# Patient Record
Sex: FEMALE | Race: WHITE | NOT HISPANIC OR LATINO | Employment: FULL TIME | URBAN - METROPOLITAN AREA
[De-identification: names, ages, dates, MRNs, and addresses within clinical notes are randomized per-mention and may not be internally consistent; named-entity substitution may affect disease eponyms.]

---

## 2019-10-16 ENCOUNTER — HOSPITAL ENCOUNTER (OUTPATIENT)
Dept: LAB | Facility: HOSPITAL | Age: 29
Discharge: HOME | End: 2019-10-16
Attending: INTERNAL MEDICINE

## 2019-10-16 ENCOUNTER — OFFICE VISIT CONVERTED (OUTPATIENT)
Dept: FAMILY MEDICINE CLINIC | Facility: CLINIC | Age: 29
End: 2019-10-16
Attending: INTERNAL MEDICINE

## 2019-10-16 LAB
ALBUMIN SERPL-MCNC: 4.7 G/DL (ref 3.5–5)
ALBUMIN/GLOB SERPL: 1.8 {RATIO} (ref 1.4–2.6)
ALP SERPL-CCNC: 55 U/L (ref 42–98)
ALT SERPL-CCNC: 16 U/L (ref 10–40)
ANION GAP SERPL CALC-SCNC: 25 MMOL/L (ref 8–19)
AST SERPL-CCNC: 20 U/L (ref 15–50)
BASOPHILS # BLD AUTO: 0.04 10*3/UL (ref 0–0.2)
BASOPHILS NFR BLD AUTO: 0.6 % (ref 0–3)
BILIRUB SERPL-MCNC: 0.46 MG/DL (ref 0.2–1.3)
BUN SERPL-MCNC: 10 MG/DL (ref 5–25)
BUN/CREAT SERPL: 13 {RATIO} (ref 6–20)
CALCIUM SERPL-MCNC: 9.6 MG/DL (ref 8.7–10.4)
CHLORIDE SERPL-SCNC: 104 MMOL/L (ref 99–111)
CHOLEST SERPL-MCNC: 157 MG/DL (ref 107–200)
CHOLEST/HDLC SERPL: 2 {RATIO} (ref 3–6)
CONV ABS IMM GRAN: 0.02 10*3/UL (ref 0–0.2)
CONV CO2: 20 MMOL/L (ref 22–32)
CONV IMMATURE GRAN: 0.3 % (ref 0–1.8)
CONV TOTAL PROTEIN: 7.3 G/DL (ref 6.3–8.2)
CREAT UR-MCNC: 0.76 MG/DL (ref 0.5–0.9)
DEPRECATED RDW RBC AUTO: 45.8 FL (ref 36.4–46.3)
EOSINOPHIL # BLD AUTO: 0.16 10*3/UL (ref 0–0.7)
EOSINOPHIL # BLD AUTO: 2.4 % (ref 0–7)
ERYTHROCYTE [DISTWIDTH] IN BLOOD BY AUTOMATED COUNT: 13.6 % (ref 11.7–14.4)
EST. AVERAGE GLUCOSE BLD GHB EST-MCNC: 82 MG/DL
GFR SERPLBLD BASED ON 1.73 SQ M-ARVRAT: >60 ML/MIN/{1.73_M2}
GLOBULIN UR ELPH-MCNC: 2.6 G/DL (ref 2–3.5)
GLUCOSE SERPL-MCNC: 99 MG/DL (ref 65–99)
HBA1C MFR BLD: 4.5 % (ref 3.5–5.7)
HCT VFR BLD AUTO: 41.7 % (ref 37–47)
HDLC SERPL-MCNC: 79 MG/DL (ref 40–60)
HGB BLD-MCNC: 13.1 G/DL (ref 12–16)
LDLC SERPL CALC-MCNC: 63 MG/DL (ref 70–100)
LYMPHOCYTES # BLD AUTO: 1.74 10*3/UL (ref 1–5)
LYMPHOCYTES NFR BLD AUTO: 26.6 % (ref 20–45)
MCH RBC QN AUTO: 28.9 PG (ref 27–31)
MCHC RBC AUTO-ENTMCNC: 31.4 G/DL (ref 33–37)
MCV RBC AUTO: 92.1 FL (ref 81–99)
MONOCYTES # BLD AUTO: 0.49 10*3/UL (ref 0.2–1.2)
MONOCYTES NFR BLD AUTO: 7.5 % (ref 3–10)
NEUTROPHILS # BLD AUTO: 4.1 10*3/UL (ref 2–8)
NEUTROPHILS NFR BLD AUTO: 62.6 % (ref 30–85)
NRBC CBCN: 0 % (ref 0–0.7)
OSMOLALITY SERPL CALC.SUM OF ELEC: 299 MOSM/KG (ref 273–304)
PLATELET # BLD AUTO: 172 10*3/UL (ref 130–400)
PMV BLD AUTO: 11.2 FL (ref 9.4–12.3)
POTASSIUM SERPL-SCNC: 3.9 MMOL/L (ref 3.5–5.3)
RBC # BLD AUTO: 4.53 10*6/UL (ref 4.2–5.4)
SODIUM SERPL-SCNC: 145 MMOL/L (ref 135–147)
TRIGL SERPL-MCNC: 74 MG/DL (ref 40–150)
VLDLC SERPL-MCNC: 15 MG/DL (ref 5–37)
WBC # BLD AUTO: 6.55 10*3/UL (ref 4.8–10.8)

## 2019-12-31 ENCOUNTER — HOSPITAL ENCOUNTER (OUTPATIENT)
Dept: LAB | Facility: HOSPITAL | Age: 29
Discharge: HOME | End: 2019-12-31
Attending: INTERNAL MEDICINE

## 2019-12-31 LAB
ALBUMIN SERPL-MCNC: 4.8 G/DL (ref 3.5–5)
ALBUMIN/GLOB SERPL: 1.6 {RATIO} (ref 1.4–2.6)
ALP SERPL-CCNC: 53 U/L (ref 42–98)
ALT SERPL-CCNC: 14 U/L (ref 10–40)
ANION GAP SERPL CALC-SCNC: 20 MMOL/L (ref 8–19)
AST SERPL-CCNC: 17 U/L (ref 15–50)
BILIRUB SERPL-MCNC: 0.36 MG/DL (ref 0.2–1.3)
BUN SERPL-MCNC: 15 MG/DL (ref 5–25)
BUN/CREAT SERPL: 18 {RATIO} (ref 6–20)
CALCIUM SERPL-MCNC: 9.7 MG/DL (ref 8.7–10.4)
CHLORIDE SERPL-SCNC: 100 MMOL/L (ref 99–111)
CONV CO2: 24 MMOL/L (ref 22–32)
CONV TOTAL PROTEIN: 7.8 G/DL (ref 6.3–8.2)
CREAT UR-MCNC: 0.83 MG/DL (ref 0.5–0.9)
GFR SERPLBLD BASED ON 1.73 SQ M-ARVRAT: >60 ML/MIN/{1.73_M2}
GLOBULIN UR ELPH-MCNC: 3 G/DL (ref 2–3.5)
GLUCOSE SERPL-MCNC: 89 MG/DL (ref 65–99)
OSMOLALITY SERPL CALC.SUM OF ELEC: 290 MOSM/KG (ref 273–304)
POTASSIUM SERPL-SCNC: 3.7 MMOL/L (ref 3.5–5.3)
SODIUM SERPL-SCNC: 140 MMOL/L (ref 135–147)

## 2020-02-20 ENCOUNTER — HOSPITAL ENCOUNTER (OUTPATIENT)
Dept: GENERAL RADIOLOGY | Facility: HOSPITAL | Age: 30
Discharge: HOME | End: 2020-02-20
Attending: OBSTETRICS & GYNECOLOGY

## 2020-10-28 ENCOUNTER — OFFICE VISIT CONVERTED (OUTPATIENT)
Dept: FAMILY MEDICINE CLINIC | Facility: CLINIC | Age: 30
End: 2020-10-28
Attending: INTERNAL MEDICINE

## 2020-12-10 ENCOUNTER — OFFICE VISIT CONVERTED (OUTPATIENT)
Dept: FAMILY MEDICINE CLINIC | Facility: CLINIC | Age: 30
End: 2020-12-10
Attending: INTERNAL MEDICINE

## 2021-05-13 NOTE — PROGRESS NOTES
Progress Note      Patient Name: Sera Cordova   Patient ID: [id number]   Sex: Female   YOB: 1990        Visit Date: October 28, 2020    Provider: Sunny Felix DO   Location: [facility]   Location Address: [address] [place] [address], [address]  [place], [place]  [id number]   Location Phone: [phone]          Chief Complaint  · Anxiety   · Trouble Sleeping      History Of Present Illness  Sera Cordova is a 30 year old /White female who presents for evaluation and treatment of:      Pt is here for 1 year f/u.    Pt states that her last visit you discuss about her having trouble sleeping and her anxiety but last year she didn't want to start medications. Patient has no trouble falling asleep but does have trouble staying asleep. Once she wakes up, she has trouble falling asleep.    Pt states that she would like to start medication since everything has been worst d/t work, life and stress. Patient had a miscarriage this previous April and has some depression and anxiety as a result of that. Patient is tearful at times during the encounter.       Past Surgical History  Procedure Name Date Notes   Appendectomy --  --    [place] Tooth Extraction --  --          Medication List  Name Date Started Instructions   cpap machine w/supplies 11/25/2019 use as directed QHS 15cm/h2o pressures (dx: G47.33 RONN)   Flonase Allergy Relief 50 mcg/actuation nasal spray,suspension  spray 1 spray (50 mcg) in each nostril by intranasal route once daily   Zyrtec 10 mg oral capsule  take 1 capsule by oral route daily         Allergy List  Allergen Name Date Reaction Notes   PENICILLINS --  --  --    SULFA (SULFONAMIDES) --  --  --          Family Medical History  Disease Name Relative/Age Notes   Family history of prostate cancer Grandfather (paternal)/   --    Family history of stroke Grandfather (maternal)/  Grandmother (maternal)/  Uncle/   --    Family history of diabetes mellitus Grandfather  "(maternal)/  Grandmother (paternal)/  Uncle/   --    Family history of hypertension Father/  Grandfather (maternal)/  Grandmother (maternal)/  Grandmother (paternal)/  Mother/   --          Social History  Finding Status Start/Stop Quantity Notes   Alcohol Current some day --/-- --  --     --  --/-- --  --    Tobacco Never --/-- --  --          Review of Systems  · Constitutional  o Denies  o : fatigue, night sweats  · Eyes  o Denies  o : double vision, blurred vision  · HENT  o Denies  o : vertigo, recent head injury  · Cardiovascular  o Denies  o : chest pain, irregular heart beats  · Respiratory  o Denies  o : shortness of breath, productive cough  · Gastrointestinal  o Denies  o : nausea, vomiting  · Genitourinary  o Denies  o : dysuria, urinary retention  · Integument  o Denies  o : hair growth change, new skin lesions  · Neurologic  o Denies  o : altered mental status, seizures  · Musculoskeletal  o Denies  o : joint swelling, limitation of motion  · Endocrine  o Denies  o : cold intolerance, heat intolerance  · Psychiatric  o Admits  o : anxiety, depression  o Denies  o : suicidal ideation, homicidal ideation  · Heme-Lymph  o Denies  o : petechiae, lymph node enlargement or tenderness  · Allergic-Immunologic  o Denies  o : frequent illnesses      Vitals  Date Time BP Position Site L\R Cuff Size HR RR TEMP (F) WT  HT  BMI kg/m2 BSA m2 O2 Sat FR L/min FiO2 HC       10/16/2019 10:45 /68 Sitting    109 - R   117lbs 2oz 5'  6\" 18.9 1.57 100 %      10/28/2020 11:55 AM 96/65 Sitting    68 - R  98.2 116lbs 4oz 5'  7\" 18.21 1.58 100 %  21%          Physical Examination  · Constitutional  o Appearance  o : alert, oriented, in no acute distress, well developed, well-nourished, patient tearful at times during encounter  · Eyes  o Vision  o : Conjuntivae: Normal, Sclerae white, Pupils: PERRL, Cornea: Clear, no lesions bilateral  · Ears, Nose, Mouth and Throat  o Ears  o : Ext. Ears: Normal shape, Non tender, " EACs: Normal , Tragus intact bilaterally, Hearing: intact to conversational voice bilaterally  o Nose  o : No nasal discharge, Mucosa: normal, Septum: midline, Sinuses: Nontender  o Throat  o : Oropharynx: no inflmation or lesions, no purulence or drainage  · Neck  o Inspection/Palpation  o : Supple, no masses or tenderness, no deformities, Trachea: Midline, ROM: with in normal limits, no neck stiffness, no lymphadenopathy  o Thyroid  o : no thyomegaly, no palpabale masses   · Respiratory  o Auscultation of Lungs  o : normal breath sounds throughout, no wheeze, rhonchi, or crackles  · Cardiovascular  o Heart  o : Regular rate and rhythm, Normal S1,S2 , No cardiac murmers, No S3 or S4 gallop or rubs  · Gastrointestinal  o Abdominal Examination  o : abdomen soft, nontender, non distended, no rigidity, gaurding, rebound tenderness, no ventral hernias present  o Liver and spleen  o : no hepatomegaly present, liver nontender to palpation, spleen not palpable  · Skin and Subcutaneous Tissue  o General Inspection  o : no rashes on visible skin, normal skin color, warm and dry  o Digits and Nails  o : no clubbing, cyanosis, deformities or edema present, normal appearing nails  · Neurologic  o Mental Status Examination  o : alert and oriented to time, place, and person. Gait and Station: normal gait, able to stand without difficulty. CN 2-12 grossly intact   · Psychiatric  o Judgment and Insight  o : judgment and insight intact  o Mood and Affect  o : normal mood and affect          Assessment  · Screening for depression     V79.0/Z13.89  · Insomnia, unspecified     780.52/G47.00  · Anxiety and depression       Anxiety disorder, unspecified     300.00/F41.9  Major depressive disorder, single episode, unspecified     300.00/F32.9  Will do a trial of Lexapro to see if helps anxiety/depression which in turn will help with sleep. Patient understands risks and benefits and wishes to proceed. Follow up in four  weeks.      Plan  · Orders  o ACO - Pt declines to or was not able to provide an Advance Care Plan or name a Surrogate Decision Maker (1124F) - - 10/28/2020  o ACO-39: Current medications updated and reviewed (1159F, ) - - 10/28/2020  o ACO-18: Positive screen for clinical depression using a standardized tool and a follow-up plan documented () - - 10/28/2020  · Medications  o escitalopram oxalate 10 mg oral tablet   SIG: take 1 tablet (10 mg) by oral route once daily for 30 days   DISP: (30) Tablet with 3 refills  Prescribed on 10/28/2020     o Flonase Allergy Relief 50 mcg/actuation nasal spray,suspension   SIG: spray 1 spray (50 mcg) in each nostril by intranasal route once daily for 30 days   DISP: (1) Each with 5 refills  Prescribed on 10/28/2020     o Zyrtec 10 mg oral tablet   SIG: take 1 tablet (10 mg) by oral route once daily for 30 days   DISP: (30) Tablet with 5 refills  Prescribed on 10/28/2020     o Medications have been Reconciled  o Transition of Care or Provider Policy  · Instructions  o Depression Screen completed and scanned into the EMR under the designated folder within the patient's documents.  o Today's PHQ-9 result is 11  o Avoid any electronic use for at least 30 minutes prior to bed time. Cell phone screens, tablets and TVs imitate daylight, so your brain can become confused on the time of day. No caffeine use in the late afternoon and evenings.  o Take all medications as prescribed/directed.  o Patient was educated/instructed on their diagnosis, treatment and medications prior to discharge from the clinic today.  o Patient was instructed to exercise regularly.  o Patient instructed to seek medical attention urgently for new or worsening symptoms.  o Call the office with any concerns or questions.  o Bring all medicines with their bottles to each office visit.  o Minutes spent with patient including greater than 50% in Education/Counseling/Care Coordination.  o Time spent with the  patient was minutes, more than 50% face to face.  o Chronic conditions reviewed and taken into consideration for today's treatment plan.  o Discussed Covid-19 precautions including, but not limited to, social distancing, avoid touching your face, and hand washing.   o Electronically Identified Patient Education Materials Provided Electronically  · Disposition  o Follow up in one month.            Electronically Signed by: Sunyn Felix, DO -Author on October 28, 2020 12:18:00 PM

## 2021-05-13 NOTE — PROGRESS NOTES
Progress Note      Patient Name: Sera Cordova   Patient ID: [id number]   Sex: Female   YOB: 1990    Primary Care Provider: Sunny Felix DO    Visit Date: December 10, 2020    Provider: Sunny Felix DO   Location: [facility]   Location Address: [address] [place] [address], [address]  [place], [place]  [id number]   Location Phone: [phone]          Chief Complaint  · 4 week f/u   · anxiety   · Depression      History Of Present Illness  Sera Corodva is a 30 year old /White female who presents for evaluation and treatment of:      Pt is here for 4 week f/u on anxiety/depression.    Pt states that her sleep has improved a little. She not having anymore thoughts running through her head since the Lexapro was started. Patient states overall she feels improvement in her mod. Patient and I discussed possibly adding a sleep aid. Patient states bigger problem is staying asleep. We discussed how both of these medicines it would be preferable to stopping prior to her trying to get pregnant given possible side effects to baby. Patient understands this.    Pt still wakes up at least 3times through out the night.           Past Surgical History  Procedure Name Date Notes   Appendectomy --  --    [place] Tooth Extraction --  --          Medication List  Name Date Started Instructions   escitalopram oxalate 10 mg oral tablet 12/10/2020 take 1 tablet (10 mg) by oral route once daily for 30 days   Flonase Allergy Relief 50 mcg/actuation nasal spray,suspension 10/28/2020 spray 1 spray (50 mcg) in each nostril by intranasal route once daily for 30 days   Zyrtec 10 mg oral tablet 10/28/2020 take 1 tablet (10 mg) by oral route once daily for 30 days         Allergy List  Allergen Name Date Reaction Notes   PENICILLINS --  --  --    SULFA (SULFONAMIDES) --  --  --          Family Medical History  Disease Name Relative/Age Notes   Family history of prostate cancer Grandfather  "(paternal)/   --    Family history of stroke Grandfather (maternal)/  Grandmother (maternal)/  Uncle/   --    Family history of diabetes mellitus Grandfather (maternal)/  Grandmother (paternal)/  Uncle/   --    Family history of hypertension Father/  Grandfather (maternal)/  Grandmother (maternal)/  Grandmother (paternal)/  Mother/   --          Social History  Finding Status Start/Stop Quantity Notes   Alcohol Current some day --/-- --  --     --  --/-- --  --    Tobacco Never --/-- --  --          Review of Systems  · Constitutional  o Denies  o : fatigue, night sweats  · Eyes  o Denies  o : double vision, blurred vision  · HENT  o Denies  o : vertigo, recent head injury  · Cardiovascular  o Denies  o : chest pain, irregular heart beats  · Respiratory  o Denies  o : shortness of breath, productive cough  · Gastrointestinal  o Denies  o : nausea, vomiting  · Genitourinary  o Denies  o : dysuria, urinary retention  · Integument  o Denies  o : hair growth change, new skin lesions  · Neurologic  o Denies  o : altered mental status, seizures  · Musculoskeletal  o Denies  o : joint swelling, limitation of motion  · Endocrine  o Denies  o : cold intolerance, heat intolerance  · Psychiatric  o Admits  o : anxiety, depression, difficulty sleeping  · Heme-Lymph  o Denies  o : petechiae, lymph node enlargement or tenderness  · Allergic-Immunologic  o Denies  o : frequent illnesses      Vitals  Date Time BP Position Site L\R Cuff Size HR RR TEMP (F) WT  HT  BMI kg/m2 BSA m2 O2 Sat FR L/min FiO2 HC       10/28/2020 11:55 AM 96/65 Sitting    68 - R  98.2 116lbs 4oz 5'  7\" 18.21 1.58 100 %  21%    12/10/2020 11:56 /78 Sitting    69 - R  97.5 114lbs 8oz 5'  6.5\" 18.2 1.56 96 %  21%          Physical Examination  · Constitutional  o Appearance  o : alert, oriented, in no acute distress, well developed, well-nourished  · Eyes  o Vision  o : Conjuntivae: Normal, Sclerae white, Pupils: PERRL, Cornea: Clear, no lesions " bilateral  · Ears, Nose, Mouth and Throat  o Ears  o : Ext. Ears: Normal shape, Non tender, EACs: Normal , Tragus intact bilaterally, Hearing: intact to conversational voice bilaterally  o Nose  o : No nasal discharge, Mucosa: normal, Septum: midline, Sinuses: Nontender  o Throat  o : Oropharynx: no inflmation or lesions, no purulence or drainage  · Neck  o Inspection/Palpation  o : Supple, no masses or tenderness, no deformities, Trachea: Midline, ROM: with in normal limits, no neck stiffness, no lymphadenopathy  o Thyroid  o : no thyomegaly, no palpabale masses   · Respiratory  o Auscultation of Lungs  o : normal breath sounds throughout, no wheeze, rhonchi, or crackles  · Cardiovascular  o Heart  o : Regular rate and rhythm, Normal S1,S2 , No cardiac murmers, No S3 or S4 gallop or rubs  · Gastrointestinal  o Abdominal Examination  o : abdomen soft, nontender, non distended, no rigidity, gaurding, rebound tenderness, no ventral hernias present  o Liver and spleen  o : no hepatomegaly present, liver nontender to palpation, spleen not palpable  · Skin and Subcutaneous Tissue  o General Inspection  o : no rashes on visible skin, normal skin color, warm and dry  o Digits and Nails  o : no clubbing, cyanosis, deformities or edema present, normal appearing nails  · Neurologic  o Mental Status Examination  o : alert and oriented to time, place, and person. Gait and Station: normal gait, able to stand without difficulty. CN 2-12 grossly intact   · Psychiatric  o Judgment and Insight  o : judgment and insight intact  o Mood and Affect  o : normal mood and affect          Results  · In-Office Procedures  o Lab procedure  § IOP - Urine Drug Screen In-House [facility] ([address])   § Amphetamines Ur Ql: Negative   § Barbiturates Ur Ql: Negative   § Buprenorphine+Nor Ur Ql Scn: Negative   § Benzodiaz Ur Ql: Negative   § Cocaine Ur Ql: Negative   § Methadone Ur Ql: Negative   § Methamphet Ur Ql: Negative   § MDMA Ur Ql Scn: Negative    § Opiates Ur Ql: Negative   § Oxycodone Ur Ql: Negative   § PCP Ur Ql: Negative   § THC Ur Ql: Negative   § Temp in Range?: Within/Acceptable   § Control Seen?: Yes       Assessment  · Insomnia, unspecified     780.52/G47.00  · Anxiety and depression       Anxiety disorder, unspecified     300.00/F41.9  Major depressive disorder, single episode, unspecified     300.00/F32.9  Continue with current dose of Lexapro and start low dose of Restoril. Patient ELIGIO and UDS to be completed today. Patient recommended to follow up again in another month    Problems Reconciled  Plan  · Orders  o ACO - Pt declines to or was not able to provide an Advance Care Plan or name a Surrogate Decision Maker (1124F) - - 12/10/2020  o ELIGIO Report (KASPR) - - 12/26/2020  o ACO-39: Current medications updated and reviewed (, 1159F) - - 12/10/2020  o ACO-14: Influenza immunization was not administered for reasons documented [facility] () - - 12/10/2020  · Medications  o temazepam 7.5 mg oral capsule   SIG: take 1 capsule (7.5 mg) by oral route once daily at bedtime as needed for 30 days   DISP: (30) Capsule with 3 refills  Prescribed on 12/10/2020     o escitalopram oxalate 10 mg oral tablet   SIG: take 1 tablet (10 mg) by oral route once daily for 30 days   DISP: (30) Tablet with 3 refills  Refilled on 12/10/2020     o cpap machine w/supplies   SIG: use as directed QHS 15cm/h2o pressures (dx: G47.33 RONN)   DISP: (1) kit with 0 refills  Discontinued on 12/10/2020     o Medications have been Reconciled  o Transition of Care or Provider Policy  · Instructions  o Avoid any electronic use for at least 30 minutes prior to bed time. Cell phone screens, tablets and TVs imitate daylight, so your brain can become confused on the time of day. No caffeine use in the late afternoon and evenings.  o Take all medications as prescribed/directed.  o Patient was educated/instructed on their diagnosis, treatment and medications prior to discharge from  the clinic today.  o Patient was instructed to exercise regularly.  o Patient instructed to seek medical attention urgently for new or worsening symptoms.  o Call the office with any concerns or questions.  o Bring all medicines with their bottles to each office visit.  o Minutes spent with patient including greater than 50% in Education/Counseling/Care Coordination.  o Time spent with the patient was minutes, more than 50% face to face.  o Chronic conditions reviewed and taken into consideration for today's treatment plan.  o Discussed Covid-19 precautions including, but not limited to, social distancing, avoid touching your face, and hand washing.   o Electronically Identified Patient Education Materials Provided Electronically  · Disposition  o Follow up in one month.            Electronically Signed by: Sunny Felix, DO -Author on December 26, 2020 03:07:09 PM

## 2021-05-14 VITALS
SYSTOLIC BLOOD PRESSURE: 110 MMHG | DIASTOLIC BLOOD PRESSURE: 78 MMHG | HEIGHT: 66 IN | OXYGEN SATURATION: 96 % | TEMPERATURE: 97.5 F | BODY MASS INDEX: 18.4 KG/M2 | HEART RATE: 69 BPM | WEIGHT: 114.5 LBS

## 2021-05-14 VITALS
BODY MASS INDEX: 18.25 KG/M2 | TEMPERATURE: 98.2 F | HEIGHT: 67 IN | HEART RATE: 68 BPM | DIASTOLIC BLOOD PRESSURE: 65 MMHG | OXYGEN SATURATION: 100 % | WEIGHT: 116.25 LBS | SYSTOLIC BLOOD PRESSURE: 96 MMHG

## 2021-05-15 VITALS
WEIGHT: 117.12 LBS | HEART RATE: 109 BPM | OXYGEN SATURATION: 100 % | DIASTOLIC BLOOD PRESSURE: 68 MMHG | SYSTOLIC BLOOD PRESSURE: 118 MMHG | HEIGHT: 66 IN | BODY MASS INDEX: 18.82 KG/M2

## 2021-09-21 PROCEDURE — U0004 COV-19 TEST NON-CDC HGH THRU: HCPCS | Performed by: EMERGENCY MEDICINE

## 2021-09-28 ENCOUNTER — HOSPITAL ENCOUNTER (OUTPATIENT)
Dept: GENERAL RADIOLOGY | Facility: HOSPITAL | Age: 31
Discharge: HOME | End: 2021-09-28
Admitting: NURSE PRACTITIONER

## 2021-09-28 ENCOUNTER — TELEPHONE (OUTPATIENT)
Dept: FAMILY MEDICINE CLINIC | Facility: CLINIC | Age: 31
End: 2021-09-28

## 2021-09-28 ENCOUNTER — OFFICE VISIT (OUTPATIENT)
Dept: FAMILY MEDICINE CLINIC | Facility: CLINIC | Age: 31
End: 2021-09-28

## 2021-09-28 VITALS
DIASTOLIC BLOOD PRESSURE: 76 MMHG | SYSTOLIC BLOOD PRESSURE: 116 MMHG | WEIGHT: 124 LBS | BODY MASS INDEX: 19.93 KG/M2 | HEIGHT: 66 IN | OXYGEN SATURATION: 100 % | HEART RATE: 77 BPM

## 2021-09-28 DIAGNOSIS — J06.9 UPPER RESPIRATORY TRACT INFECTION, UNSPECIFIED TYPE: ICD-10-CM

## 2021-09-28 DIAGNOSIS — J06.9 UPPER RESPIRATORY TRACT INFECTION, UNSPECIFIED TYPE: Primary | ICD-10-CM

## 2021-09-28 PROCEDURE — 71046 X-RAY EXAM CHEST 2 VIEWS: CPT

## 2021-09-28 PROCEDURE — 99213 OFFICE O/P EST LOW 20 MIN: CPT | Performed by: NURSE PRACTITIONER

## 2021-09-28 RX ORDER — AZITHROMYCIN 250 MG/1
TABLET, FILM COATED ORAL
Qty: 6 TABLET | Refills: 0 | Status: SHIPPED | OUTPATIENT
Start: 2021-09-28 | End: 2021-10-20

## 2021-09-28 NOTE — PROGRESS NOTES
Chief Complaint  Earache (Patient states that this started two days ago and is in the left ear. ), Headache (Patient states that she has had a headache for a week now and states that she has been taking Ibuprofen for this. ), and URI (Patient states that this has been going on for about a week. She states that there is no pain, it is just when she breathes in it burns like she is breathing in really cold air. )    Subjective          Sera Cordova presents to [facility]  URI   This is a new problem. The current episode started 1 to 4 weeks ago. The problem has been unchanged. There has been no fever. Associated symptoms include coughing, ear pain and headaches. She has tried antihistamine and acetaminophen (flonase) for the symptoms. The treatment provided mild relief.           Past Medical History:   Diagnosis Date   • Asthma due to environmental allergies          Allergies   Allergen Reactions   • Penicillins Rash   • Sulfa Antibiotics Rash          Past Surgical History:   Procedure Laterality Date   • APPENDECTOMY     • OTHER SURGICAL HISTORY      ENDOMETRIOSIS EXCISION   • [place] TOOTH EXTRACTION            Social History     Tobacco Use   • Smoking status: Never Smoker   • Smokeless tobacco: Never Used   Substance Use Topics   • Alcohol use: Yes     Comment: RARE         Family History   Problem Relation Age of Onset   • Hypertension Mother    • Hypertension Father    • Stroke Maternal Grandmother    • Hypertension Maternal Grandmother    • Stroke Maternal Grandfather    • Diabetes Maternal Grandfather    • Hypertension Maternal Grandfather    • Diabetes Paternal Grandmother    • Hypertension Paternal Grandmother    • Prostate cancer Paternal Grandfather    • Stroke Other         uncle   • Diabetes Other         uncle          Current Outpatient Medications on File Prior to Visit   Medication Sig   • Cetirizine HCl (ZyrTEC Allergy) 10 MG capsule Zyrtec 10 mg oral capsule  "take 1 capsule by oral route daily   Suspended   • fluticasone (FLONASE) 50 MCG/ACT nasal spray 2 sprays into the nostril(s) as directed by provider Daily.     No current facility-administered medications on file prior to visit.           There is no immunization history on file for this patient.      /76   Pulse 77   Ht 167.6 cm (66\")   Wt 56.2 kg (124 lb)   SpO2 100%   BMI 20.01 kg/m²             Physical Exam  Vitals reviewed.   Constitutional:       Appearance: Normal appearance. She is well-developed.   HENT:      Head: Normocephalic and atraumatic.      Right Ear: Tympanic membrane and external ear normal.      Left Ear: External ear normal. Tympanic membrane is scarred.      Mouth/Throat:      Lips: Pink.      Mouth: Mucous membranes are moist.      Pharynx: No oropharyngeal exudate.   Eyes:      Conjunctiva/sclera: Conjunctivae normal.      Pupils: Pupils are equal, round, and reactive to light.   Cardiovascular:      Rate and Rhythm: Normal rate and regular rhythm.      Heart sounds: No murmur heard.   No friction rub. No gallop.    Pulmonary:      Effort: Pulmonary effort is normal.      Breath sounds: Normal breath sounds. No wheezing or rhonchi.   Skin:     General: Skin is warm and dry.   Neurological:      Mental Status: She is alert and oriented to person, place, and time.      Cranial Nerves: No cranial nerve deficit.   Psychiatric:         Mood and Affect: Mood and affect normal.         Behavior: Behavior normal.         Thought Content: Thought content normal.         Judgment: Judgment normal.             Result Review :                           Assessment and Plan      Diagnoses and all orders for this visit:    1. Upper respiratory tract infection, unspecified type (Primary)  -     XR Chest PA & Lateral; Future  -     azithromycin (Zithromax Z-Jose) 250 MG tablet; Take 2 tablets by mouth on day 1, then 1 tablet daily on days 2-5  Dispense: 6 tablet; Refill: 0              Follow Up "     Return if symptoms worsen or fail to improve.    Patient was given instructions and counseling regarding her condition or for health maintenance advice. Please see specific information pulled into the AVS if appropriate.

## 2021-10-20 ENCOUNTER — OFFICE VISIT (OUTPATIENT)
Dept: FAMILY MEDICINE CLINIC | Facility: CLINIC | Age: 31
End: 2021-10-20

## 2021-10-20 VITALS
DIASTOLIC BLOOD PRESSURE: 74 MMHG | HEART RATE: 76 BPM | TEMPERATURE: 98.9 F | BODY MASS INDEX: 19.44 KG/M2 | HEIGHT: 66 IN | WEIGHT: 121 LBS | OXYGEN SATURATION: 99 % | SYSTOLIC BLOOD PRESSURE: 105 MMHG

## 2021-10-20 DIAGNOSIS — F32.A DEPRESSION, UNSPECIFIED DEPRESSION TYPE: ICD-10-CM

## 2021-10-20 DIAGNOSIS — F41.9 ANXIETY: Primary | ICD-10-CM

## 2021-10-20 PROCEDURE — 99213 OFFICE O/P EST LOW 20 MIN: CPT | Performed by: NURSE PRACTITIONER

## 2021-10-20 RX ORDER — HYDROXYZINE HYDROCHLORIDE 25 MG/1
25 TABLET, FILM COATED ORAL 2 TIMES DAILY
Qty: 60 TABLET | Refills: 1 | Status: SHIPPED | OUTPATIENT
Start: 2021-10-20 | End: 2021-11-14

## 2021-10-20 RX ORDER — ESCITALOPRAM OXALATE 10 MG/1
10 TABLET ORAL DAILY
Qty: 30 TABLET | Refills: 1 | Status: SHIPPED | OUTPATIENT
Start: 2021-10-20 | End: 2021-12-01 | Stop reason: SDUPTHER

## 2021-10-20 NOTE — PROGRESS NOTES
Chief Complaint  Anxiety and Depression    Subjective          Sera Cordova presents to [facility]  History of Present Illness    Patient complaining of anxiety and depression.  Patient states a lot of her symptoms are a result of increased work related stress.  She states she was on Lexapro 10mg QD for same symptoms in the past with good control of symptoms.  She states the medication did not take her symptoms away completely, but did make them manageable.  Patient is tearful in the office d/t having a miscarriage over a year ago.  Patient states she gets overwhelmed to the point she can't get anything done.  Patient states she has always had issues with staying focused, this has increased a lot.  Patient states she cries a lot, anything on TV that is sad or emotional makes her cry.  Patient states she had an anxiety attack at work a few weeks ago and had to leave.  Patient states she was on a conference call going over staffing issues when she started crying and breathing heavy, she was unable to take a full breath.  Patient states she does not worry about one thing specifically but is always worried that something bad is going to happen to them.  Patient's father has prostate cancer and she is fearful that cancer is going to come back or that her parents are going to get in an accident while travelling.  Patient worries obsessively that her  has gotten in an accident if she hasn't spoken to him in a while.  Patient has not consulted psych, but she did try doing an on-line counseling benefit through work, which was not beneficial to her.    Patient wishes to try back the Lexapro, she did feel that it helped her.  Past Medical History:   Diagnosis Date   • Anxiety    • Asthma due to environmental allergies          Allergies   Allergen Reactions   • Penicillins Rash   • Sulfa Antibiotics Rash          Past Surgical History:   Procedure Laterality Date   • APPENDECTOMY     •  "OTHER SURGICAL HISTORY      ENDOMETRIOSIS EXCISION   • WISDOM TOOTH EXTRACTION            Social History     Tobacco Use   • Smoking status: Never Smoker   • Smokeless tobacco: Never Used   Substance Use Topics   • Alcohol use: Yes     Comment: RARE         Family History   Problem Relation Age of Onset   • Hypertension Mother    • Hypertension Father    • Stroke Maternal Grandmother    • Hypertension Maternal Grandmother    • Stroke Maternal Grandfather    • Diabetes Maternal Grandfather    • Hypertension Maternal Grandfather    • Diabetes Paternal Grandmother    • Hypertension Paternal Grandmother    • Prostate cancer Paternal Grandfather    • Stroke Other         uncle   • Diabetes Other         uncle          Current Outpatient Medications on File Prior to Visit   Medication Sig   • Cetirizine HCl (ZyrTEC Allergy) 10 MG capsule Zyrtec 10 mg oral capsule take 1 capsule by oral route daily   Suspended   • fluticasone (FLONASE) 50 MCG/ACT nasal spray 2 sprays into the nostril(s) as directed by provider Daily.     No current facility-administered medications on file prior to visit.         Immunization History   Administered Date(s) Administered   • COVID-19 (PFIZER) 03/09/2021, 04/06/2021         /74 (BP Location: Left arm, Patient Position: Sitting)   Pulse 76   Temp 98.9 °F (37.2 °C) (Oral)   Ht 167.6 cm (66\")   Wt 54.9 kg (121 lb)   SpO2 99%   BMI 19.53 kg/m²             Physical Exam  Vitals reviewed.   Constitutional:       Appearance: Normal appearance. She is well-developed.   HENT:      Head: Normocephalic and atraumatic.      Right Ear: External ear normal.      Left Ear: External ear normal.      Mouth/Throat:      Pharynx: No oropharyngeal exudate.   Eyes:      Conjunctiva/sclera: Conjunctivae normal.      Pupils: Pupils are equal, round, and reactive to light.   Cardiovascular:      Rate and Rhythm: Normal rate and regular rhythm.      Heart sounds: No murmur heard.  No friction rub. No " gallop.    Pulmonary:      Effort: Pulmonary effort is normal.      Breath sounds: Normal breath sounds. No wheezing or rhonchi.   Skin:     General: Skin is warm and dry.   Neurological:      Mental Status: She is alert and oriented to person, place, and time.      Cranial Nerves: No cranial nerve deficit.   Psychiatric:         Attention and Perception: Attention normal.         Mood and Affect: Mood and affect normal.         Speech: Speech normal.         Behavior: Behavior normal. Behavior is cooperative.         Thought Content: Thought content normal.         Judgment: Judgment normal.      Comments: Patient tearful throughout office visit             Result Review :                           Assessment and Plan      Diagnoses and all orders for this visit:    1. Anxiety (Primary)  Comments:  Trial of hydroxyzine as needed while waiting for Lexapro to build up in system.  Orders:  -     hydrOXYzine (ATARAX) 25 MG tablet; Take 1 tablet by mouth 2 (two) times a day.  Dispense: 60 tablet; Refill: 1    2. Depression, unspecified depression type  Comments:  Lexapro 10 mg daily, side effects and administration addressed.  Orders:  -     escitalopram (Lexapro) 10 MG tablet; Take 1 tablet by mouth Daily.  Dispense: 30 tablet; Refill: 1      Patient highly encouraged to seek out therapy and counseling services.  She does agree to this.  Patient provided name of counseling provider.  Patient to call if any questions or concerns.        Follow Up     Return in about 2 months (around 12/20/2021).    Patient was given instructions and counseling regarding her condition or for health maintenance advice. Please see specific information pulled into the AVS if appropriate.

## 2021-11-11 DIAGNOSIS — F41.9 ANXIETY: ICD-10-CM

## 2021-11-14 RX ORDER — HYDROXYZINE HYDROCHLORIDE 25 MG/1
TABLET, FILM COATED ORAL
Qty: 60 TABLET | Refills: 1 | Status: SHIPPED | OUTPATIENT
Start: 2021-11-14

## 2021-12-01 ENCOUNTER — OFFICE VISIT (OUTPATIENT)
Dept: FAMILY MEDICINE CLINIC | Facility: CLINIC | Age: 31
End: 2021-12-01

## 2021-12-01 VITALS
WEIGHT: 121 LBS | HEIGHT: 66 IN | DIASTOLIC BLOOD PRESSURE: 70 MMHG | SYSTOLIC BLOOD PRESSURE: 104 MMHG | OXYGEN SATURATION: 99 % | HEART RATE: 75 BPM | BODY MASS INDEX: 19.44 KG/M2

## 2021-12-01 DIAGNOSIS — F32.A DEPRESSION, UNSPECIFIED DEPRESSION TYPE: ICD-10-CM

## 2021-12-01 DIAGNOSIS — F41.9 ANXIETY: Primary | ICD-10-CM

## 2021-12-01 PROCEDURE — 99213 OFFICE O/P EST LOW 20 MIN: CPT | Performed by: NURSE PRACTITIONER

## 2021-12-01 RX ORDER — ESCITALOPRAM OXALATE 10 MG/1
10 TABLET ORAL DAILY
Qty: 90 TABLET | Refills: 0 | Status: SHIPPED | OUTPATIENT
Start: 2021-12-01 | End: 2022-02-16 | Stop reason: SDUPTHER

## 2021-12-01 NOTE — PROGRESS NOTES
Chief Complaint  Anxiety (Patient was placed on Lexapro and Hydroxyzine at her last visit for anxiety. Patient reports that this medication has helped and feels like the medication does not need to be altered at this time. )    Subjective          Sera Cordova presents to [facility]  Anxiety  Presents for follow-up visit. Symptoms include excessive worry. Primary symptoms comment: pt states her worry has improved since starting the medication . Symptoms occur most days. The quality of sleep is good.           Anxiety (Patient was placed on Lexapro and Hydroxyzine at her last visit for anxiety. Patient reports that this medication has helped and feels like the medication does not need to be altered at this time.    Pt has not started therapy as of yet. Pt states her work place has gotten a bit better, however she still plans on looking for another job. Pt reports her sleep is better.       Past Medical History:   Diagnosis Date   • Anxiety    • Asthma due to environmental allergies          Allergies   Allergen Reactions   • Penicillins Rash   • Sulfa Antibiotics Rash          Past Surgical History:   Procedure Laterality Date   • APPENDECTOMY     • OTHER SURGICAL HISTORY      ENDOMETRIOSIS EXCISION   • WISDOM TOOTH EXTRACTION            Social History     Tobacco Use   • Smoking status: Never Smoker   • Smokeless tobacco: Never Used   Substance Use Topics   • Alcohol use: Yes     Comment: RARE         Family History   Problem Relation Age of Onset   • Hypertension Mother    • Hypertension Father    • Stroke Maternal Grandmother    • Hypertension Maternal Grandmother    • Stroke Maternal Grandfather    • Diabetes Maternal Grandfather    • Hypertension Maternal Grandfather    • Diabetes Paternal Grandmother    • Hypertension Paternal Grandmother    • Prostate cancer Paternal Grandfather    • Stroke Other         uncle   • Diabetes Other         uncle          Current Outpatient  "Medications on File Prior to Visit   Medication Sig   • Cetirizine HCl (ZyrTEC Allergy) 10 MG capsule Zyrtec 10 mg oral capsule take 1 capsule by oral route daily   Suspended   • fluticasone (FLONASE) 50 MCG/ACT nasal spray 2 sprays into the nostril(s) as directed by provider Daily.   • hydrOXYzine (ATARAX) 25 MG tablet TAKE 1 TABLET BY MOUTH TWICE A DAY   • [DISCONTINUED] escitalopram (Lexapro) 10 MG tablet Take 1 tablet by mouth Daily.     No current facility-administered medications on file prior to visit.         Immunization History   Administered Date(s) Administered   • COVID-19 (PFIZER) 03/09/2021, 04/06/2021         /70   Pulse 75   Ht 167.6 cm (66\")   Wt 54.9 kg (121 lb)   SpO2 99%   BMI 19.53 kg/m²             Physical Exam  Vitals reviewed.   Constitutional:       Appearance: Normal appearance. She is well-developed.   HENT:      Head: Normocephalic and atraumatic.      Right Ear: External ear normal.      Left Ear: External ear normal.      Mouth/Throat:      Pharynx: No oropharyngeal exudate.   Eyes:      Conjunctiva/sclera: Conjunctivae normal.      Pupils: Pupils are equal, round, and reactive to light.   Cardiovascular:      Rate and Rhythm: Normal rate and regular rhythm.      Heart sounds: No murmur heard.  No friction rub. No gallop.    Pulmonary:      Effort: Pulmonary effort is normal.      Breath sounds: Normal breath sounds. No wheezing or rhonchi.   Skin:     General: Skin is warm and dry.   Neurological:      Mental Status: She is alert and oriented to person, place, and time.      Cranial Nerves: No cranial nerve deficit.   Psychiatric:         Mood and Affect: Mood and affect normal.         Behavior: Behavior normal.         Thought Content: Thought content normal.         Judgment: Judgment normal.             Result Review :                           Assessment and Plan      Diagnoses and all orders for this visit:    1. Anxiety (Primary)  Comments:  well controlled on " current dose of lexapro, cont medication    2. Depression, unspecified depression type  Comments:  Lexapro 10 mg daily, side effects and administration addressed.  Orders:  -     escitalopram (Lexapro) 10 MG tablet; Take 1 tablet by mouth Daily.  Dispense: 90 tablet; Refill: 0              Follow Up     Return in about 10 weeks (around 2/9/2022).    Patient was given instructions and counseling regarding her condition or for health maintenance advice. Please see specific information pulled into the AVS if appropriate.

## 2022-02-16 ENCOUNTER — OFFICE VISIT (OUTPATIENT)
Dept: FAMILY MEDICINE CLINIC | Facility: CLINIC | Age: 32
End: 2022-02-16

## 2022-02-16 VITALS
WEIGHT: 121.4 LBS | TEMPERATURE: 98.8 F | OXYGEN SATURATION: 99 % | HEART RATE: 77 BPM | HEIGHT: 66 IN | BODY MASS INDEX: 19.51 KG/M2 | SYSTOLIC BLOOD PRESSURE: 102 MMHG | DIASTOLIC BLOOD PRESSURE: 68 MMHG

## 2022-02-16 DIAGNOSIS — Z00.00 ANNUAL PHYSICAL EXAM: Primary | ICD-10-CM

## 2022-02-16 DIAGNOSIS — R53.83 FATIGUE, UNSPECIFIED TYPE: ICD-10-CM

## 2022-02-16 DIAGNOSIS — F32.A DEPRESSION, UNSPECIFIED DEPRESSION TYPE: ICD-10-CM

## 2022-02-16 DIAGNOSIS — F41.9 ANXIETY: ICD-10-CM

## 2022-02-16 PROCEDURE — 99395 PREV VISIT EST AGE 18-39: CPT | Performed by: NURSE PRACTITIONER

## 2022-02-16 RX ORDER — ESCITALOPRAM OXALATE 10 MG/1
10 TABLET ORAL DAILY
Qty: 90 TABLET | Refills: 1 | Status: SHIPPED | OUTPATIENT
Start: 2022-02-16 | End: 2022-09-06 | Stop reason: CLARIF

## 2022-02-16 NOTE — PROGRESS NOTES
Chief Complaint  Anxiety and Depression    Subjective          Sera Cordova presents to [facility]  History of Present Illness    Anxiety/Depression:  Patient is taking Lexapro with good control of symptoms.  Patient also has Hydroxyzine which she rarely takes as it does not help her Anxiety, but she does occasionally take it to help her sleep.  Patient denies suicidal ideations or thoughts of harming herself or others.    Pt c/o fatigue. She states she wakes up tired and hits the snooze button several times before awakening. She denies snoring. She is have regular monthly periods, but denies that they are heavy.     Past Medical History:   Diagnosis Date   • Anxiety    • Asthma due to environmental allergies          Allergies   Allergen Reactions   • Penicillins Rash   • Sulfa Antibiotics Rash          Past Surgical History:   Procedure Laterality Date   • APPENDECTOMY     • OTHER SURGICAL HISTORY      ENDOMETRIOSIS EXCISION   • WISDOM TOOTH EXTRACTION            Social History     Tobacco Use   • Smoking status: Never Smoker   • Smokeless tobacco: Never Used   Substance Use Topics   • Alcohol use: Yes     Comment: RARE         Family History   Problem Relation Age of Onset   • Hypertension Mother    • Hypertension Father    • Stroke Maternal Grandmother    • Hypertension Maternal Grandmother    • Stroke Maternal Grandfather    • Diabetes Maternal Grandfather    • Hypertension Maternal Grandfather    • Diabetes Paternal Grandmother    • Hypertension Paternal Grandmother    • Prostate cancer Paternal Grandfather    • Stroke Other         uncle   • Diabetes Other         uncle          Current Outpatient Medications on File Prior to Visit   Medication Sig   • Cetirizine HCl (ZyrTEC Allergy) 10 MG capsule Zyrtec 10 mg oral capsule take 1 capsule by oral route daily   Suspended   • fluticasone (FLONASE) 50 MCG/ACT nasal spray 2 sprays into the nostril(s) as directed by provider  "Daily.   • hydrOXYzine (ATARAX) 25 MG tablet TAKE 1 TABLET BY MOUTH TWICE A DAY   • [DISCONTINUED] escitalopram (Lexapro) 10 MG tablet Take 1 tablet by mouth Daily.     No current facility-administered medications on file prior to visit.         Immunization History   Administered Date(s) Administered   • COVID-19 (PFIZER) PURPLE CAP 03/09/2021, 04/06/2021         /68 (BP Location: Left arm, Patient Position: Sitting, Cuff Size: Adult)   Pulse 77   Temp 98.8 °F (37.1 °C) (Oral)   Ht 167.6 cm (66\")   Wt 55.1 kg (121 lb 6.4 oz)   SpO2 99%   BMI 19.59 kg/m²             Physical Exam  Vitals reviewed.   Constitutional:       Appearance: Normal appearance. She is well-developed.   HENT:      Head: Normocephalic and atraumatic.      Right Ear: External ear normal.      Left Ear: External ear normal.      Mouth/Throat:      Pharynx: No oropharyngeal exudate.   Eyes:      Conjunctiva/sclera: Conjunctivae normal.      Pupils: Pupils are equal, round, and reactive to light.   Cardiovascular:      Rate and Rhythm: Normal rate and regular rhythm.      Heart sounds: No murmur heard.  No friction rub. No gallop.    Pulmonary:      Effort: Pulmonary effort is normal.      Breath sounds: Normal breath sounds. No wheezing or rhonchi.   Abdominal:      General: Bowel sounds are normal.      Palpations: Abdomen is soft.   Skin:     General: Skin is warm and dry.   Neurological:      Mental Status: She is alert and oriented to person, place, and time.      Cranial Nerves: No cranial nerve deficit.   Psychiatric:         Mood and Affect: Mood and affect normal.         Behavior: Behavior normal.         Thought Content: Thought content normal.         Judgment: Judgment normal.             Result Review :                           Assessment and Plan      Diagnoses and all orders for this visit:    1. Annual physical exam (Primary)  -     Lipid Panel With / Chol / HDL Ratio; Future    2. Anxiety  Comments:  Well-controlled " with Lexapro, continue current medication.  Orders:  -     escitalopram (Lexapro) 10 MG tablet; Take 1 tablet by mouth Daily.  Dispense: 90 tablet; Refill: 1    3. Depression, unspecified depression type  Comments:  Symptoms well controlled with current medication regimen, cont. Current meds.  Orders:  -     escitalopram (Lexapro) 10 MG tablet; Take 1 tablet by mouth Daily.  Dispense: 90 tablet; Refill: 1    4. Fatigue, unspecified type  -     CBC & Differential; Future  -     Comprehensive Metabolic Panel; Future  -     TSH; Future  -     T4, Free; Future  -     EBV Antibody Profile; Future  -     CMV IgG IgM; Future  -     Vitamin B12; Future  -     Folate; Future  -     Iron Profile; Future  -     Ferritin; Future  -     Vitamin D 25 Hydroxy              Follow Up     Return in about 4 months (around 6/16/2022).    Patient was given instructions and counseling regarding her condition or for health maintenance advice. Please see specific information pulled into the AVS if appropriate.

## 2022-02-23 ENCOUNTER — LAB (OUTPATIENT)
Dept: LAB | Facility: HOSPITAL | Age: 32
End: 2022-02-23

## 2022-02-23 DIAGNOSIS — Z00.00 ANNUAL PHYSICAL EXAM: ICD-10-CM

## 2022-02-23 DIAGNOSIS — R53.83 FATIGUE, UNSPECIFIED TYPE: ICD-10-CM

## 2022-02-23 LAB
25(OH)D3 SERPL-MCNC: 28.6 NG/ML (ref 30–100)
ALBUMIN SERPL-MCNC: 4.5 G/DL (ref 3.5–5.2)
ALBUMIN/GLOB SERPL: 1.6 G/DL
ALP SERPL-CCNC: 58 U/L (ref 39–117)
ALT SERPL W P-5'-P-CCNC: 19 U/L (ref 1–33)
ANION GAP SERPL CALCULATED.3IONS-SCNC: 11.5 MMOL/L (ref 5–15)
AST SERPL-CCNC: 19 U/L (ref 1–32)
BASOPHILS # BLD AUTO: 0.03 10*3/MM3 (ref 0–0.2)
BASOPHILS NFR BLD AUTO: 0.6 % (ref 0–1.5)
BILIRUB SERPL-MCNC: 0.5 MG/DL (ref 0–1.2)
BUN SERPL-MCNC: 13 MG/DL (ref 6–20)
BUN/CREAT SERPL: 17.3 (ref 7–25)
CALCIUM SPEC-SCNC: 9.6 MG/DL (ref 8.6–10.5)
CHLORIDE SERPL-SCNC: 103 MMOL/L (ref 98–107)
CHOLEST SERPL-MCNC: 205 MG/DL (ref 0–200)
CO2 SERPL-SCNC: 24.5 MMOL/L (ref 22–29)
CREAT SERPL-MCNC: 0.75 MG/DL (ref 0.57–1)
DEPRECATED RDW RBC AUTO: 41.2 FL (ref 37–54)
EOSINOPHIL # BLD AUTO: 0.08 10*3/MM3 (ref 0–0.4)
EOSINOPHIL NFR BLD AUTO: 1.6 % (ref 0.3–6.2)
ERYTHROCYTE [DISTWIDTH] IN BLOOD BY AUTOMATED COUNT: 12.5 % (ref 12.3–15.4)
FERRITIN SERPL-MCNC: 39.7 NG/ML (ref 13–150)
FOLATE SERPL-MCNC: 11.5 NG/ML (ref 4.78–24.2)
GFR SERPL CREATININE-BSD FRML MDRD: 90 ML/MIN/1.73
GLOBULIN UR ELPH-MCNC: 2.9 GM/DL
GLUCOSE SERPL-MCNC: 78 MG/DL (ref 65–99)
HCT VFR BLD AUTO: 41.8 % (ref 34–46.6)
HDLC SERPL QL: 2.66
HDLC SERPL-MCNC: 77 MG/DL (ref 40–60)
HGB BLD-MCNC: 13.4 G/DL (ref 12–15.9)
IMM GRANULOCYTES # BLD AUTO: 0.01 10*3/MM3 (ref 0–0.05)
IMM GRANULOCYTES NFR BLD AUTO: 0.2 % (ref 0–0.5)
IRON 24H UR-MRATE: 110 MCG/DL (ref 37–145)
IRON SATN MFR SERPL: 31 % (ref 20–50)
LDLC SERPL CALC-MCNC: 121 MG/DL (ref 0–100)
LYMPHOCYTES # BLD AUTO: 1.77 10*3/MM3 (ref 0.7–3.1)
LYMPHOCYTES NFR BLD AUTO: 34.6 % (ref 19.6–45.3)
MCH RBC QN AUTO: 28.9 PG (ref 26.6–33)
MCHC RBC AUTO-ENTMCNC: 32.1 G/DL (ref 31.5–35.7)
MCV RBC AUTO: 90.1 FL (ref 79–97)
MONOCYTES # BLD AUTO: 0.41 10*3/MM3 (ref 0.1–0.9)
MONOCYTES NFR BLD AUTO: 8 % (ref 5–12)
NEUTROPHILS NFR BLD AUTO: 2.81 10*3/MM3 (ref 1.7–7)
NEUTROPHILS NFR BLD AUTO: 55 % (ref 42.7–76)
NRBC BLD AUTO-RTO: 0 /100 WBC (ref 0–0.2)
PLATELET # BLD AUTO: 171 10*3/MM3 (ref 140–450)
PMV BLD AUTO: 11.4 FL (ref 6–12)
POTASSIUM SERPL-SCNC: 4.1 MMOL/L (ref 3.5–5.2)
PROT SERPL-MCNC: 7.4 G/DL (ref 6–8.5)
RBC # BLD AUTO: 4.64 10*6/MM3 (ref 3.77–5.28)
SODIUM SERPL-SCNC: 139 MMOL/L (ref 136–145)
T4 FREE SERPL-MCNC: 1.12 NG/DL (ref 0.93–1.7)
TIBC SERPL-MCNC: 361 MCG/DL (ref 298–536)
TRANSFERRIN SERPL-MCNC: 242 MG/DL (ref 200–360)
TRIGL SERPL-MCNC: 40 MG/DL (ref 0–150)
TSH SERPL DL<=0.05 MIU/L-ACNC: 1.72 UIU/ML (ref 0.27–4.2)
VIT B12 BLD-MCNC: 607 PG/ML (ref 211–946)
VLDLC SERPL-MCNC: 7 MG/DL (ref 5–40)
WBC NRBC COR # BLD: 5.11 10*3/MM3 (ref 3.4–10.8)

## 2022-02-23 PROCEDURE — 82728 ASSAY OF FERRITIN: CPT

## 2022-02-23 PROCEDURE — 80050 GENERAL HEALTH PANEL: CPT

## 2022-02-23 PROCEDURE — 36415 COLL VENOUS BLD VENIPUNCTURE: CPT

## 2022-02-23 PROCEDURE — 82607 VITAMIN B-12: CPT

## 2022-02-23 PROCEDURE — 83540 ASSAY OF IRON: CPT

## 2022-02-23 PROCEDURE — 84439 ASSAY OF FREE THYROXINE: CPT

## 2022-02-23 PROCEDURE — 86644 CMV ANTIBODY: CPT

## 2022-02-23 PROCEDURE — 84466 ASSAY OF TRANSFERRIN: CPT

## 2022-02-23 PROCEDURE — 86665 EPSTEIN-BARR CAPSID VCA: CPT

## 2022-02-23 PROCEDURE — 80061 LIPID PANEL: CPT

## 2022-02-23 PROCEDURE — 82746 ASSAY OF FOLIC ACID SERUM: CPT

## 2022-02-23 PROCEDURE — 86664 EPSTEIN-BARR NUCLEAR ANTIGEN: CPT

## 2022-02-23 PROCEDURE — 86645 CMV ANTIBODY IGM: CPT

## 2022-02-23 PROCEDURE — 82306 VITAMIN D 25 HYDROXY: CPT | Performed by: NURSE PRACTITIONER

## 2022-02-24 LAB
CMV IGG SERPL IA-ACNC: 4.7 U/ML (ref 0–0.59)
CMV IGM SERPL IA-ACNC: <30 AU/ML (ref 0–29.9)
EBV NA IGG SER IA-ACNC: 381 U/ML (ref 0–17.9)
EBV VCA IGG SER IA-ACNC: 98.7 U/ML (ref 0–17.9)
EBV VCA IGM SER IA-ACNC: <36 U/ML (ref 0–35.9)
SERVICE CMNT-IMP: ABNORMAL

## 2022-03-01 ENCOUNTER — TELEPHONE (OUTPATIENT)
Dept: FAMILY MEDICINE CLINIC | Facility: CLINIC | Age: 32
End: 2022-03-01

## 2022-03-01 NOTE — TELEPHONE ENCOUNTER
----- Message from SHELIA Gil sent at 2/28/2022 12:18 PM EST -----  Past EBV infection, nothing acute.  Past CMV infection nothing acute.  Cholesterol elevated, tighter diet control.  Otherwise normal labs.

## 2022-03-01 NOTE — TELEPHONE ENCOUNTER
----- Message from SHELIA Gil sent at 2/28/2022 12:17 PM EST -----  Vitamin D slightly low, patient can take an OTC vitamin D supplement 3 times weekly.

## 2022-08-08 NOTE — PROGRESS NOTES
"Well Woman Visit    CC: Scheduled annual well gyn visit  Chief Complaint   Patient presents with   • Gynecologic Exam       Myriad intake in the past?: Yes  Qualified and had green result  Risk of breast cancer 11.4%    Contraception:  Abstinence    HPI:   31 y.o.     Menses:   q 30 days, lasts 5-7 days, changes products q 2-4 hrs on heaviest days.     Pain:  Moderate, not too bad    PCP: does manage PMHx and preventative labs  History: PMHx, Meds, Allergies, PSHx, Social Hx, and POBHx all reviewed and updated.    Pt has concerns she would like to discuss.    PHYSICAL EXAM:  /74   Pulse 81   Ht 167.6 cm (66\")   Wt 56.3 kg (124 lb 3.2 oz)   LMP 2022 (Approximate)   Breastfeeding No   BMI 20.05 kg/m²  Not found.  General- NAD, alert and oriented, appropriate  Psych- Normal mood, good memory  Neck- No masses, no thyroid enlargement  CV- Regular rhythm, no murnurs  Resp- CTA to bases, no wheezes  Abdomen- Soft, non distended, non tender, no masses    Breast left-  Bilaterally symmetrical, no masses, non tender, no nipple discharge  Breast right- Bilaterally symmetrical, no masses, non tender, no nipple discharge    External genitalia- Normal female, no lesions  Urethra/meatus- Normal, no masses, non tender  Bladder- Normal, no masses, non tender  Vagina- Normal, no atrophy, no lesions, no discharge.  Prolapse: No prolapse  Cvx- Normal, no lesions, no discharge, No cervical motion tenderness  Uterus- TENDER, DECREASED MOBILITY  Adnexa- TENDER RIGHT, TENDER LEFT  Anus/Rectum/Perineum- Not performed    Lymphatic- No palpable neck, axillary, or groin nodes  Ext- No edema, no cyanosis    Skin- No lesions, no rashes, no acanthosis nigricans      ASSESSMENT and PLAN:    Diagnoses and all orders for this visit:    1. Well woman exam with routine gynecological exam (Primary)    2. Endometriosis  Assessment & Plan:  Has a known h/o endometriosis diagnosed surgically  States symptoms are getting " worse  Hasn't tolerated OCPs in the past secondary to nausea and didn't tolerate NuvaRing    Orders:  -     Elagolix Sodium (Orilissa) 150 MG tablet; Take 150 mg by mouth Daily.  Dispense: 30 tablet; Refill: 11      Preventative:  • BREAST HEALTH- Monthly self breast exam importance and how to reviewed. MMG and/or MRI (prn) reviewed per society guidelines and her individual history. Screen: Not medically needed  • CERVICAL CANCER Screening- Reviewed current ASCCP guidelines for screening w and wo cotest HPV, age specific.  Screen: Updated today  • Follow up PCP/Specialist PMHx and Labs  MYRIAD: Qualifies for testing. She plans to check with her insurance and will return if desires.  ENDOMETRIOSIS- Dx, incidence, familial tendency, recurrence, periods/pain/dyspareunia, pregnancy, risk of other pain syndromes. Tx options wrt pt hx NLT expectant, hormonal (BC, IUD, Lupron, Orilissa, others), outpt dx L/S, hyst +/- BSO.  She understands the importance of having any ordered tests to be performed in a timely fashion.  The risks of not performing them include, but are not limited to, advanced cancer stages, bone loss from osteoporosis and/or subsequent increase in morbidity and/or mortality.  She is encouraged to review her results online and/or contact or office if she has questions.     Follow Up:  Return in about 1 year (around 8/9/2023) for Annual physical.            Angela Pantoja MD  08/09/2022    [facility]  [facility] [place] [address]  Dept: [phone]  Dept Fax: [phone]  Loc: [phone]  Loc Fax: [phone]

## 2022-08-09 ENCOUNTER — OFFICE VISIT (OUTPATIENT)
Dept: OBSTETRICS AND GYNECOLOGY | Facility: CLINIC | Age: 32
End: 2022-08-09

## 2022-08-09 VITALS
DIASTOLIC BLOOD PRESSURE: 74 MMHG | BODY MASS INDEX: 19.96 KG/M2 | SYSTOLIC BLOOD PRESSURE: 110 MMHG | HEIGHT: 66 IN | WEIGHT: 124.2 LBS | HEART RATE: 81 BPM

## 2022-08-09 DIAGNOSIS — N80.9 ENDOMETRIOSIS: ICD-10-CM

## 2022-08-09 DIAGNOSIS — Z01.419 WELL WOMAN EXAM WITH ROUTINE GYNECOLOGICAL EXAM: Primary | ICD-10-CM

## 2022-08-09 PROCEDURE — 99395 PREV VISIT EST AGE 18-39: CPT | Performed by: OBSTETRICS & GYNECOLOGY

## 2022-08-09 PROCEDURE — G0123 SCREEN CERV/VAG THIN LAYER: HCPCS | Performed by: OBSTETRICS & GYNECOLOGY

## 2022-08-09 PROCEDURE — 87624 HPV HI-RISK TYP POOLED RSLT: CPT | Performed by: OBSTETRICS & GYNECOLOGY

## 2022-08-09 RX ORDER — ELAGOLIX 150 MG/1
150 TABLET, FILM COATED ORAL DAILY
Qty: 30 TABLET | Refills: 11 | Status: SHIPPED | OUTPATIENT
Start: 2022-08-09 | End: 2022-09-06

## 2022-08-09 NOTE — ASSESSMENT & PLAN NOTE
Has a known h/o endometriosis diagnosed surgically  States symptoms are getting worse  Hasn't tolerated OCPs in the past secondary to nausea and didn't tolerate NuvaRing

## 2022-08-15 LAB
CYTOLOGIST CVX/VAG CYTO: NORMAL
CYTOLOGY CVX/VAG DOC CYTO: NORMAL
CYTOLOGY CVX/VAG DOC THIN PREP: NORMAL
DX ICD CODE: NORMAL
HIV 1 & 2 AB SER-IMP: NORMAL
HPV I/H RISK 4 DNA CVX QL PROBE+SIG AMP: NEGATIVE
OTHER STN SPEC: NORMAL
STAT OF ADQ CVX/VAG CYTO-IMP: NORMAL

## 2022-09-06 ENCOUNTER — OFFICE VISIT (OUTPATIENT)
Dept: FAMILY MEDICINE CLINIC | Facility: CLINIC | Age: 32
End: 2022-09-06

## 2022-09-06 VITALS
WEIGHT: 122 LBS | HEART RATE: 69 BPM | HEIGHT: 66 IN | OXYGEN SATURATION: 98 % | DIASTOLIC BLOOD PRESSURE: 81 MMHG | SYSTOLIC BLOOD PRESSURE: 114 MMHG | BODY MASS INDEX: 19.61 KG/M2

## 2022-09-06 DIAGNOSIS — F41.9 ANXIETY: Primary | ICD-10-CM

## 2022-09-06 PROCEDURE — 99213 OFFICE O/P EST LOW 20 MIN: CPT | Performed by: NURSE PRACTITIONER

## 2022-09-06 RX ORDER — BUSPIRONE HYDROCHLORIDE 7.5 MG/1
7.5 TABLET ORAL 2 TIMES DAILY
Qty: 60 TABLET | Refills: 2 | Status: SHIPPED | OUTPATIENT
Start: 2022-09-06 | End: 2023-02-09 | Stop reason: DRUGHIGH

## 2022-09-06 NOTE — PROGRESS NOTES
Chief Complaint  Anxiety    SUBJECTIVE  Sera Cordova presents to [facility] for 4 month follow up on anxiety.    Pt states she stopped taking her lexapro and atarax several months ago pt stated she was feeling good and didn't feel the need for them anymore. Pt states her depression is controlled, but her anxiety is severe. She  from her spouse in July and she states this has helped her depression.     Pt reports she's had an increase of anxiety along with panic attacks and started taking her medication recently about a week ago. Pt states immediately upon waking up     Pt takes the atarax at night due to making her sleepy.       History of Present Illness  Past Medical History:   Diagnosis Date   • Anxiety    • Asthma due to environmental allergies    • Depression    • Endometriosis    • Ovarian cyst       Family History   Problem Relation Age of Onset   • Hypertension Mother    • Hypertension Father    • Stroke Maternal Grandmother    • Hypertension Maternal Grandmother    • Stroke Maternal Grandfather    • Diabetes Maternal Grandfather    • Hypertension Maternal Grandfather    • Diabetes Paternal Grandmother    • Hypertension Paternal Grandmother    • Prostate cancer Paternal Grandfather    • Stroke Other         uncle   • Diabetes Other         uncle      Past Surgical History:   Procedure Laterality Date   • APPENDECTOMY     • OTHER SURGICAL HISTORY      ENDOMETRIOSIS EXCISION   • WISDOM TOOTH EXTRACTION          Current Outpatient Medications:   •  Cetirizine HCl (ZyrTEC Allergy) 10 MG capsule, Zyrtec 10 mg oral capsule take 1 capsule by oral route daily   Suspended, Disp: , Rfl:   •  fluticasone (FLONASE) 50 MCG/ACT nasal spray, 2 sprays into the nostril(s) as directed by provider Daily., Disp: , Rfl:   •  hydrOXYzine (ATARAX) 25 MG tablet, TAKE 1 TABLET BY MOUTH TWICE A DAY, Disp: 60 tablet, Rfl: 1  •  busPIRone (BUSPAR) 7.5 MG tablet, Take 1 tablet by mouth 2 (Two)  "Times a Day., Disp: 60 tablet, Rfl: 2    OBJECTIVE  Vital Signs:   /81   Pulse 69   Ht 167.6 cm (66\")   Wt 55.3 kg (122 lb)   SpO2 98%   BMI 19.69 kg/m²    Estimated body mass index is 19.69 kg/m² as calculated from the following:    Height as of this encounter: 167.6 cm (66\").    Weight as of this encounter: 55.3 kg (122 lb).     Wt Readings from Last 3 Encounters:   09/06/22 55.3 kg (122 lb)   08/09/22 56.3 kg (124 lb 3.2 oz)   02/16/22 55.1 kg (121 lb 6.4 oz)     BP Readings from Last 3 Encounters:   09/06/22 114/81   08/09/22 110/74   02/16/22 102/68       Physical Exam  Vitals reviewed.   Constitutional:       Appearance: Normal appearance. She is well-developed.   HENT:      Head: Normocephalic and atraumatic.      Right Ear: External ear normal.      Left Ear: External ear normal.      Mouth/Throat:      Pharynx: No oropharyngeal exudate.   Eyes:      Conjunctiva/sclera: Conjunctivae normal.      Pupils: Pupils are equal, round, and reactive to light.   Cardiovascular:      Rate and Rhythm: Normal rate and regular rhythm.      Heart sounds: No murmur heard.    No friction rub. No gallop.   Pulmonary:      Effort: Pulmonary effort is normal.      Breath sounds: Normal breath sounds. No wheezing or rhonchi.   Skin:     General: Skin is warm and dry.   Neurological:      Mental Status: She is alert and oriented to person, place, and time.      Cranial Nerves: No cranial nerve deficit.   Psychiatric:         Mood and Affect: Affect normal. Mood is anxious.         Behavior: Behavior normal.         Thought Content: Thought content normal.         Judgment: Judgment normal.          Result Review        No Images in the past 120 days found..     The above data has been reviewed by SHELIA Gil 09/06/2022 12:11 EDT.          Patient Care Team:  Jerica Moody APRN as PCP - General (Family Medicine)    BMI is within normal parameters. No other follow-up for BMI required.       ASSESSMENT & " PLAN    Diagnoses and all orders for this visit:    1. Anxiety (Primary)  Comments:  DC Lexapro, start BuSpar twice daily, side effect and administration addressed.  Patient states she will make upcoming appointment for therapist.  Orders:  -     busPIRone (BUSPAR) 7.5 MG tablet; Take 1 tablet by mouth 2 (Two) Times a Day.  Dispense: 60 tablet; Refill: 2         Tobacco Use: Low Risk    • Smoking Tobacco Use: Never Smoker   • Smokeless Tobacco Use: Never Used       Follow Up     Return in about 7 weeks (around 10/25/2022).    Patient was given instructions and counseling regarding her condition or for health maintenance advice. Please see specific information pulled into the AVS if appropriate.   I have reviewed information obtained and documented by others and I have confirmed the accuracy of this documented note.    SHELIA Gil

## 2023-02-08 ENCOUNTER — TELEPHONE (OUTPATIENT)
Dept: FAMILY MEDICINE CLINIC | Facility: CLINIC | Age: 33
End: 2023-02-08
Payer: COMMERCIAL

## 2023-02-08 NOTE — TELEPHONE ENCOUNTER
----- Message from SHELIA Gil sent at 2/8/2023  8:24 AM EST -----  Regarding: FW: Medical Question- reoccurring yeast infection.   Contact: [phone]  Pt needs appt for evaluation and swab.  ----- Message -----  From: Cony Edmondson  Sent: 2/2/2023   2:30 PM EST  To: SHELIA Gil  Subject: FW: Medical Question- reoccurring yeast infe#      ----- Message -----  From: Sera Cordova  Sent: 2/2/2023   2:21 PM EST  To: [facility] [employer] Clinical Pool  Subject: Medical Question- reoccurring yeast infectio#    Hello. I am unable to come in for an appointment anytime soon but was hoping you could possibly help me through here. For the past couple of months. I have been getting a yeast infection during my period. This has happened probably the past 3 to 4 months. It goes away with Monistat, only to return the next month. I am currently on my period and have a yeast infection again. I have not started Monistat yet because I was hoping there was something else that could be done. If I do need to come in for an appointment, I probably won’t be available until late next week sometime.    Thank you  Sera Hoff

## 2023-02-08 NOTE — TELEPHONE ENCOUNTER
----- Message from SHELIA Gil sent at 2/8/2023  8:24 AM EST -----  Regarding: FW: Medical Question- reoccurring yeast infection.   Contact: [phone]  Pt needs appt for evaluation and swab.  ----- Message -----  From: Cony Edmondson  Sent: 2/2/2023   2:30 PM EST  To: SHELIA Gil  Subject: FW: Medical Question- reoccurring yeast infe#      ----- Message -----  From: Sera Cordova  Sent: 2/2/2023   2:21 PM EST  To: [facility] [employer] Clinical Pool  Subject: Medical Question- reoccurring yeast infectio#    Hello. I am unable to come in for an appointment anytime soon but was hoping you could possibly help me through here. For the past couple of months. I have been getting a yeast infection during my period. This has happened probably the past 3 to 4 months. It goes away with Monistat, only to return the next month. I am currently on my period and have a yeast infection again. I have not started Monistat yet because I was hoping there was something else that could be done. If I do need to come in for an appointment, I probably won’t be available until late next week sometime.    Thank you  Sera Hoff

## 2023-02-08 NOTE — TELEPHONE ENCOUNTER
Phoned patient left a message that Jerica wants to see her tomorrow the patient has been placed on the schedule.

## 2023-02-09 ENCOUNTER — OFFICE VISIT (OUTPATIENT)
Dept: FAMILY MEDICINE CLINIC | Facility: CLINIC | Age: 33
End: 2023-02-09
Payer: COMMERCIAL

## 2023-02-09 VITALS
DIASTOLIC BLOOD PRESSURE: 45 MMHG | BODY MASS INDEX: 20.38 KG/M2 | SYSTOLIC BLOOD PRESSURE: 99 MMHG | TEMPERATURE: 98.3 F | OXYGEN SATURATION: 99 % | HEART RATE: 74 BPM | HEIGHT: 66 IN | WEIGHT: 126.8 LBS

## 2023-02-09 DIAGNOSIS — F41.9 ANXIETY: ICD-10-CM

## 2023-02-09 DIAGNOSIS — N89.8 VAGINAL DISCHARGE: Primary | ICD-10-CM

## 2023-02-09 LAB
C TRACH RRNA CVX QL NAA+PROBE: NOT DETECTED
CANDIDA SPECIES: NEGATIVE
GARDNERELLA VAGINALIS: NEGATIVE
N GONORRHOEA RRNA SPEC QL NAA+PROBE: NOT DETECTED
T VAGINALIS DNA VAG QL PROBE+SIG AMP: NEGATIVE

## 2023-02-09 PROCEDURE — 87660 TRICHOMONAS VAGIN DIR PROBE: CPT | Performed by: NURSE PRACTITIONER

## 2023-02-09 PROCEDURE — 87491 CHLMYD TRACH DNA AMP PROBE: CPT | Performed by: NURSE PRACTITIONER

## 2023-02-09 PROCEDURE — 87510 GARDNER VAG DNA DIR PROBE: CPT | Performed by: NURSE PRACTITIONER

## 2023-02-09 PROCEDURE — 87480 CANDIDA DNA DIR PROBE: CPT | Performed by: NURSE PRACTITIONER

## 2023-02-09 PROCEDURE — 87591 N.GONORRHOEAE DNA AMP PROB: CPT | Performed by: NURSE PRACTITIONER

## 2023-02-09 PROCEDURE — 99214 OFFICE O/P EST MOD 30 MIN: CPT | Performed by: NURSE PRACTITIONER

## 2023-02-09 RX ORDER — BUSPIRONE HYDROCHLORIDE 10 MG/1
10 TABLET ORAL 2 TIMES DAILY
Qty: 180 TABLET | Refills: 1 | Status: SHIPPED | OUTPATIENT
Start: 2023-02-09

## 2023-02-09 NOTE — PROGRESS NOTES
Chief Complaint  Vaginitis vaginal discharge  anxiety  SUBJECTIVE  Sera Hoff presents to [facility]     Patient complaining of chronic yeast infections that are occurring during her menses, causing vaginal itching.  Patient states symptoms resolve with OTC yeast medications but keep coming back.      Anxiety-  Vaginal Discharge  The patient's primary symptoms include genital itching and vaginal discharge. This is a recurrent problem. The current episode started more than 1 month ago. The problem occurs intermittently. The problem has been unchanged. The patient is experiencing no pain. She is not pregnant. The vaginal bleeding is typical of menses. She has not been passing clots. She has not been passing tissue. She has tried antifungals for the symptoms. The treatment provided mild relief. She is sexually active. No, her partner does not have an STD. She uses nothing for contraception. Her menstrual history has been regular.       Anxiety-patient recently  and has restarted back on her BuSpar twice daily.  She is desiring a dose increase.  She takes it in the morning before work but feels like the morning dose wears off.  She would prefer to take twice a day versus increasing to 3 times a day.  Past Medical History:   Diagnosis Date   • Anxiety    • Asthma due to environmental allergies    • Depression    • Endometriosis    • Ovarian cyst       Family History   Problem Relation Age of Onset   • Hypertension Mother    • Hypertension Father    • Stroke Maternal Grandmother    • Hypertension Maternal Grandmother    • Stroke Maternal Grandfather    • Diabetes Maternal Grandfather    • Hypertension Maternal Grandfather    • Diabetes Paternal Grandmother    • Hypertension Paternal Grandmother    • Prostate cancer Paternal Grandfather    • Stroke Other         uncle   • Diabetes Other         uncle      Past Surgical History:   Procedure Laterality Date   • APPENDECTOMY    "  • OTHER SURGICAL HISTORY      ENDOMETRIOSIS EXCISION   • WISDOM TOOTH EXTRACTION          Current Outpatient Medications:   •  Cetirizine HCl (ZyrTEC Allergy) 10 MG capsule, Zyrtec 10 mg oral capsule take 1 capsule by oral route daily   Suspended, Disp: , Rfl:   •  fluticasone (FLONASE) 50 MCG/ACT nasal spray, 2 sprays into the nostril(s) as directed by provider Daily., Disp: , Rfl:   •  hydrOXYzine (ATARAX) 25 MG tablet, TAKE 1 TABLET BY MOUTH TWICE A DAY, Disp: 60 tablet, Rfl: 1  •  busPIRone (BUSPAR) 10 MG tablet, Take 1 tablet by mouth 2 (Two) Times a Day., Disp: 180 tablet, Rfl: 1    OBJECTIVE  Vital Signs:   BP 99/45 (BP Location: Left arm, Patient Position: Sitting, Cuff Size: Adult)   Pulse 74   Temp 98.3 °F (36.8 °C) (Oral)   Ht 167.6 cm (66\")   Wt 57.5 kg (126 lb 12.8 oz)   SpO2 99%   BMI 20.47 kg/m²    Estimated body mass index is 20.47 kg/m² as calculated from the following:    Height as of this encounter: 167.6 cm (66\").    Weight as of this encounter: 57.5 kg (126 lb 12.8 oz).     Wt Readings from Last 3 Encounters:   02/09/23 57.5 kg (126 lb 12.8 oz)   09/06/22 55.3 kg (122 lb)   08/09/22 56.3 kg (124 lb 3.2 oz)     BP Readings from Last 3 Encounters:   02/09/23 99/45   09/06/22 114/81   08/09/22 110/74       Physical Exam  Vitals reviewed.   Constitutional:       Appearance: Normal appearance. She is well-developed.   HENT:      Head: Normocephalic and atraumatic.      Right Ear: External ear normal.      Left Ear: External ear normal.      Mouth/Throat:      Pharynx: No oropharyngeal exudate.   Eyes:      Conjunctiva/sclera: Conjunctivae normal.      Pupils: Pupils are equal, round, and reactive to light.   Neck:      Vascular: No carotid bruit.   Cardiovascular:      Rate and Rhythm: Normal rate and regular rhythm.      Pulses: Normal pulses.      Heart sounds: Normal heart sounds. No murmur heard.    No friction rub. No gallop.   Pulmonary:      Effort: Pulmonary effort is normal.      " Breath sounds: Normal breath sounds. No wheezing or rhonchi.   Genitourinary:     Vagina: Vaginal discharge present.      Cervix: Discharge present.      Uterus: Normal.       Adnexa: Right adnexa normal and left adnexa normal.   Skin:     General: Skin is warm and dry.   Neurological:      Mental Status: She is alert and oriented to person, place, and time.      Cranial Nerves: No cranial nerve deficit.   Psychiatric:         Mood and Affect: Mood and affect normal.         Behavior: Behavior normal.         Thought Content: Thought content normal.         Judgment: Judgment normal.          Result Review        No Images in the past 120 days found..      The above data has been reviewed by SHELIA Gil 02/09/2023 10:55 EST.          Patient Care Team:  Jerica Moody APRN as PCP - General (Family Medicine)    BMI is within normal parameters. No other follow-up for BMI required.       ASSESSMENT & PLAN    Diagnoses and all orders for this visit:    1. Vaginal discharge (Primary)  -     Gardnerella vaginalis, Trichomonas vaginalis, Candida albicans, DNA - Swab, Vagina; Future  -     Chlamydia trachomatis, Neisseria gonorrhoeae, PCR - , Cervix; Future    2. Anxiety  Comments:  Increase BuSpar to 10 mg twice daily.  Orders:  -     busPIRone (BUSPAR) 10 MG tablet; Take 1 tablet by mouth 2 (Two) Times a Day.  Dispense: 180 tablet; Refill: 1         Tobacco Use: Low Risk    • Smoking Tobacco Use: Never   • Smokeless Tobacco Use: Never   • Passive Exposure: Not on file       Follow Up     Return if symptoms worsen or fail to improve.      Patient was given instructions and counseling regarding her condition or for health maintenance advice. Please see specific information pulled into the AVS if appropriate.   I have reviewed information obtained and documented by others and I have confirmed the accuracy of this documented note.    SHELIA Gil

## 2023-08-15 NOTE — PROGRESS NOTES
"Well Woman Visit    CC: Scheduled annual well gyn visit  Chief Complaint   Patient presents with    Annual Exam     Recent LLQ pain for 2 weeks.       Myriad intake in the past?: Yes    DATE PERFORMED: 2020 Result: Green increased risk for 11.5% for breast cancer    Contraception:  None    HPI:   32 y.o.     Menses:   q 30 days, lasts 5 days, changes products q 3-4 hrs on heaviest days.     Pain:  Moderate, not too bad are getting a little worse but better than prior to surgery    PCP: does manage PMHx and preventative labs  History: PMHx, Meds, Allergies, PSHx, Social Hx, and POBHx all reviewed and updated.    Patient has concerns she would like to discuss    PHYSICAL EXAM:  /74   Pulse 78   Ht 167.6 cm (66\")   Wt 57.6 kg (127 lb)   LMP 2023   Breastfeeding No   BMI 20.50 kg/mý  Not found.  General- NAD, alert and oriented, appropriate  Psych- Normal mood, good memory  Neck- No masses, no thyroid enlargement  CV- Regular rhythm, no murnurs  Resp- CTA to bases, no wheezes  Abdomen- Soft, non distended, non tender, no masses    Breast left-  Bilaterally symmetrical, no masses, non tender, no nipple discharge  Breast right- Bilaterally symmetrical, no masses, non tender, no nipple discharge    External genitalia- Normal female, no lesions  Urethra/meatus- Normal, no masses, non tender  Bladder- Normal, no masses, non tender  Vagina- Normal, no atrophy, no lesions, no discharge.    Cvx- Normal, no lesions, no discharge, No cervical motion tenderness  Uterus- Normal size, shape & consistency.  Non tender, mobile, & no prolapse  Adnexa- No mass, non tender  Anus/Rectum/Perineum- Not performed    Lymphatic- No palpable neck, axillary, or groin nodes  Ext- No edema, no cyanosis    Skin- No lesions, no rashes, no acanthosis nigricans      ASSESSMENT and PLAN:    Diagnoses and all orders for this visit:    1. Well woman exam with routine gynecological exam (Primary)    2. Right lower quadrant " pain  Assessment & Plan:  Had an episode of RLQ pain for 2 weeks about 3 weeks ago  States it was a constant pain for the 2 weeks and wondered if she had an ovarian cyst  Has a h/o endometriosis and had surgery 2019 and states she thinks it is currently asymptomatic    Orders:  -     US Pelvis Transvaginal Non OB; Future    3. Endometriosis  Assessment & Plan:  Pt states symptoms are tolerable  She declines hormonal suppression  She is having some increasing dysmenorrhea but it is still better than prior to laparoscopy    Orders:  -     celecoxib (CeleBREX) 100 MG capsule; Take 1 capsule by mouth 2 (Two) Times a Day.  Dispense: 20 capsule; Refill: 5        Preventative:  BREAST HEALTH- Monthly self breast exam importance and how to reviewed. MMG and/or MRI (prn) reviewed per society guidelines and her individual history. Screen: Not medically needed  CERVICAL CANCER Screening- Reviewed current ASCCP guidelines for screening w and wo cotest HPV, age specific.  Screen: Updated today  Follow up PCP/Specialist PMHx and Labs      She understands the importance of having any ordered tests to be performed in a timely fashion.  The risks of not performing them include, but are not limited to, advanced cancer stages, bone loss from osteoporosis and/or subsequent increase in morbidity and/or mortality.  She is encouraged to review her results online and/or contact or office if she has questions.     Follow Up:  Return for post ultrasound.            Angela Pantoja MD  08/16/2023    [facility]  [address] [place] [address]  Dept: [phone]  Dept Fax: [phone]  Loc: [phone]  Loc Fax: [phone]

## 2023-08-16 ENCOUNTER — OFFICE VISIT (OUTPATIENT)
Dept: OBSTETRICS AND GYNECOLOGY | Facility: CLINIC | Age: 33
End: 2023-08-16
Payer: COMMERCIAL

## 2023-08-16 VITALS
WEIGHT: 127 LBS | HEART RATE: 78 BPM | DIASTOLIC BLOOD PRESSURE: 74 MMHG | HEIGHT: 66 IN | SYSTOLIC BLOOD PRESSURE: 110 MMHG | BODY MASS INDEX: 20.41 KG/M2

## 2023-08-16 DIAGNOSIS — R10.31 RIGHT LOWER QUADRANT PAIN: ICD-10-CM

## 2023-08-16 DIAGNOSIS — Z01.419 WELL WOMAN EXAM WITH ROUTINE GYNECOLOGICAL EXAM: Primary | ICD-10-CM

## 2023-08-16 DIAGNOSIS — N80.9 ENDOMETRIOSIS: ICD-10-CM

## 2023-08-16 RX ORDER — CELECOXIB 100 MG/1
100 CAPSULE ORAL 2 TIMES DAILY
Qty: 20 CAPSULE | Refills: 5 | Status: SHIPPED | OUTPATIENT
Start: 2023-08-16

## 2023-08-16 NOTE — ASSESSMENT & PLAN NOTE
Had an episode of RLQ pain for 2 weeks about 3 weeks ago  States it was a constant pain for the 2 weeks and wondered if she had an ovarian cyst  Has a h/o endometriosis and had surgery 2019 and states she thinks it is currently asymptomatic

## 2023-08-16 NOTE — ASSESSMENT & PLAN NOTE
Pt states symptoms are tolerable  She declines hormonal suppression  She is having some increasing dysmenorrhea but it is still better than prior to laparoscopy

## 2023-08-24 ENCOUNTER — HOSPITAL ENCOUNTER (OUTPATIENT)
Dept: ULTRASOUND IMAGING | Facility: HOSPITAL | Age: 33
Discharge: HOME | End: 2023-08-24
Admitting: OBSTETRICS & GYNECOLOGY
Payer: COMMERCIAL

## 2023-08-24 DIAGNOSIS — R10.31 RIGHT LOWER QUADRANT PAIN: ICD-10-CM

## 2023-08-24 PROCEDURE — 76856 US EXAM PELVIC COMPLETE: CPT

## 2023-08-24 PROCEDURE — 76830 TRANSVAGINAL US NON-OB: CPT

## 2023-09-01 PROBLEM — N83.209 OVARIAN CYST: Status: ACTIVE | Noted: 2023-09-01

## 2023-09-01 NOTE — PROGRESS NOTES
GYN Visit    CC: Ovarian cyst    HPI:   32 y.o. Contraception or HRT: Contraception:  None  Menses:   q 30 days, lasts 5 days, changes products q 3-4 hrs on heaviest days.   Pain:  Moderate, not too bad    Pt has no complaints today.          History: PMHx, Meds, Allergies, PSHx, Social Hx, and POBHx all reviewed and updated.  Physical Exam   PHYSICAL EXAM:  /76   Pulse 65   Wt 57.2 kg (126 lb)   LMP 2023 (Exact Date)   Breastfeeding No   BMI 20.34 kg/m²   General- NAD, alert and oriented, appropriate  Psych- Normal mood, good memory        US Pelvis Transvaginal Non OB (2023 11:03)      ASSESSMENT AND PLAN:  Diagnoses and all orders for this visit:    1. Endometriosis (Primary)  Assessment & Plan:  Discussed endometriosis and impact on fertility  Pt is interested in fertility in the future  Recommend pt see SHASHANK at this point to discuss optimizing future fertility with age and endometriosis  Pt hasn't tried celebrex for pain yet    Orders:  -     US Pelvis Transvaginal Non OB; Future    2. Abnormal uterine bleeding (AUB)  -     T4, Free  -     TSH    3. Cyst of right ovary  Assessment & Plan:  TVUS with right ovarian cyst c/w hemorrhagic corpus luteum v endometrioma  Pt has a known h/o endometriosis  Will repeat TVUS in 6 weeks to look for resolution    Orders:  -     US Pelvis Transvaginal Non OB; Future        Counseling: ENDOMETRIOSIS- Dx, incidence, familial tendency, recurrence, periods/pain/dyspareunia, pregnancy, risk of other pain syndromes. Tx options wrt pt hx NLT expectant, hormonal (BC, IUD, Lupron, Orilissa, others), outpt dx L/S, hyst +/- BSO.        Follow Up:  Return for post ultrasound.          Angela Pantoja MD  2023

## 2023-09-07 ENCOUNTER — OFFICE VISIT (OUTPATIENT)
Dept: OBSTETRICS AND GYNECOLOGY | Facility: CLINIC | Age: 33
End: 2023-09-07
Payer: COMMERCIAL

## 2023-09-07 VITALS
BODY MASS INDEX: 20.34 KG/M2 | DIASTOLIC BLOOD PRESSURE: 76 MMHG | SYSTOLIC BLOOD PRESSURE: 112 MMHG | HEART RATE: 65 BPM | WEIGHT: 126 LBS

## 2023-09-07 DIAGNOSIS — N93.9 ABNORMAL UTERINE BLEEDING (AUB): ICD-10-CM

## 2023-09-07 DIAGNOSIS — N83.201 CYST OF RIGHT OVARY: ICD-10-CM

## 2023-09-07 DIAGNOSIS — N80.9 ENDOMETRIOSIS: Primary | ICD-10-CM

## 2023-09-07 LAB
T4 FREE SERPL-MCNC: 1.09 NG/DL (ref 0.93–1.7)
TSH SERPL DL<=0.05 MIU/L-ACNC: 1.52 UIU/ML (ref 0.27–4.2)

## 2023-09-07 PROCEDURE — 84439 ASSAY OF FREE THYROXINE: CPT | Performed by: OBSTETRICS & GYNECOLOGY

## 2023-09-07 PROCEDURE — 84443 ASSAY THYROID STIM HORMONE: CPT | Performed by: OBSTETRICS & GYNECOLOGY

## 2023-09-07 NOTE — PATIENT INSTRUCTIONS
[facility]  Drs. Awadalla, Schreiber   Venipuncture Blood Specimen Collection  Venipuncture performed in right arm by scar mao with good hemostasis. Patient tolerated the procedure well without complications.   09/07/23   Maricarmen Maza

## 2023-09-07 NOTE — ASSESSMENT & PLAN NOTE
TVUS with right ovarian cyst c/w hemorrhagic corpus luteum v endometrioma  Pt has a known h/o endometriosis  Will repeat TVUS in 6 weeks to look for resolution

## 2023-09-07 NOTE — ASSESSMENT & PLAN NOTE
Discussed endometriosis and impact on fertility  Pt is interested in fertility in the future  Recommend pt see SHASHANK at this point to discuss optimizing future fertility with age and endometriosis  Pt hasn't tried celebrex for pain yet

## 2023-12-16 DIAGNOSIS — F41.9 ANXIETY: ICD-10-CM

## 2023-12-18 NOTE — TELEPHONE ENCOUNTER
Acute OV 2/9/23  No f/u appt scheduled  Last f/u 2/16/22 - sent patient MyChart msg stating she needs appt scheduled before refills can be sent.

## 2024-03-19 ENCOUNTER — OFFICE VISIT (OUTPATIENT)
Dept: FAMILY MEDICINE CLINIC | Facility: CLINIC | Age: 34
End: 2024-03-19
Payer: COMMERCIAL

## 2024-03-19 VITALS
WEIGHT: 124 LBS | HEART RATE: 104 BPM | SYSTOLIC BLOOD PRESSURE: 114 MMHG | DIASTOLIC BLOOD PRESSURE: 72 MMHG | HEIGHT: 66 IN | BODY MASS INDEX: 19.93 KG/M2 | OXYGEN SATURATION: 99 % | TEMPERATURE: 98.5 F

## 2024-03-19 DIAGNOSIS — R53.83 OTHER FATIGUE: ICD-10-CM

## 2024-03-19 DIAGNOSIS — F41.9 ANXIETY: ICD-10-CM

## 2024-03-19 DIAGNOSIS — Z00.00 ANNUAL PHYSICAL EXAM: Primary | ICD-10-CM

## 2024-03-19 DIAGNOSIS — E55.9 VITAMIN D DEFICIENCY: ICD-10-CM

## 2024-03-19 DIAGNOSIS — Z11.59 ENCOUNTER FOR HEPATITIS C SCREENING TEST FOR LOW RISK PATIENT: ICD-10-CM

## 2024-03-19 PROCEDURE — 99395 PREV VISIT EST AGE 18-39: CPT | Performed by: NURSE PRACTITIONER

## 2024-03-19 RX ORDER — BUSPIRONE HYDROCHLORIDE 10 MG/1
10 TABLET ORAL 2 TIMES DAILY
Qty: 180 TABLET | Refills: 1 | OUTPATIENT
Start: 2024-03-19

## 2024-03-19 RX ORDER — BUSPIRONE HYDROCHLORIDE 10 MG/1
10 TABLET ORAL 3 TIMES DAILY
Qty: 270 TABLET | Refills: 1 | Status: SHIPPED | OUTPATIENT
Start: 2024-03-19

## 2024-03-19 NOTE — TELEPHONE ENCOUNTER
busPIRone (BUSPAR) 10 MG tablet        Sig: Take 1 tablet by mouth 3 times a day.        Sent to pharmacy as: busPIRone HCl 10 MG Oral Tablet (BUSPAR)        Class: Normal        Route: Oral        E-Prescribing Status: Receipt confirmed by pharmacy (3/19/2024 10:24 AM EDT)

## 2024-03-19 NOTE — PROGRESS NOTES
Chief Complaint    Annual physical exam with lab  Anxiety    SUBJECTIVE  Sera Hoff presents to [facility]      Annual physical exam with lab    Anxiety/Depression:  Patient is taking Buspirone.  Patient stopped taking Hydroxyzine due to only causing her to be sleepy, not relieving her symptoms.  Patient denies suicidal ideations or thoughts of harming herself or others.   Pt is quitting iwi and has taken job at [employer]. She states this is contributing to her anxiety.  Pt states she had surgery for endometriosis in January and states that she has not felt right since.     History of Present Illness  Past Medical History:   Diagnosis Date    Anxiety     Asthma due to environmental allergies     Depression     Endometriosis     Ovarian cyst       Family History   Problem Relation Age of Onset    Prostate cancer Father     Hypertension Father     Hypertension Mother     Prostate cancer Paternal Grandfather     Diabetes Paternal Grandmother     Hypertension Paternal Grandmother     Stroke Maternal Grandmother     Hypertension Maternal Grandmother     Stroke Maternal Grandfather     Diabetes Maternal Grandfather     Hypertension Maternal Grandfather     Stroke Other         uncle    Diabetes Other         uncle    Breast cancer Neg Hx     Ovarian cancer Neg Hx     Uterine cancer Neg Hx     Colon cancer Neg Hx     Melanoma Neg Hx       Past Surgical History:   Procedure Laterality Date    APPENDECTOMY      OTHER SURGICAL HISTORY      ENDOMETRIOSIS EXCISION    WISDOM TOOTH EXTRACTION          Current Outpatient Medications:     busPIRone (BUSPAR) 10 MG tablet, Take 1 tablet by mouth 3 times a day., Disp: 270 tablet, Rfl: 1    Cetirizine HCl (ZyrTEC Allergy) 10 MG capsule, Zyrtec 10 mg oral capsule take 1 capsule by oral route daily   Suspended, Disp: , Rfl:     fluticasone (FLONASE) 50 MCG/ACT nasal spray, 2 sprays into the nostril(s) as directed by provider Daily.,  "Disp: , Rfl:     OBJECTIVE  Vital Signs:   /72 (BP Location: Left arm, Patient Position: Sitting, Cuff Size: Large Adult)   Pulse 104   Temp 98.5 °F (36.9 °C) (Oral)   Ht 167.6 cm (66\")   Wt 56.2 kg (124 lb)   SpO2 99%   BMI 20.01 kg/m²    Estimated body mass index is 20.01 kg/m² as calculated from the following:    Height as of this encounter: 167.6 cm (66\").    Weight as of this encounter: 56.2 kg (124 lb).     Wt Readings from Last 3 Encounters:   03/19/24 56.2 kg (124 lb)   09/07/23 57.2 kg (126 lb)   08/16/23 57.6 kg (127 lb)     BP Readings from Last 3 Encounters:   03/19/24 114/72   09/07/23 112/76   08/16/23 110/74       Physical Exam  Vitals reviewed.   Constitutional:       Appearance: Normal appearance. She is well-developed.   HENT:      Head: Normocephalic and atraumatic.      Right Ear: External ear normal.      Left Ear: External ear normal.      Mouth/Throat:      Pharynx: No oropharyngeal exudate.   Eyes:      Conjunctiva/sclera: Conjunctivae normal.      Pupils: Pupils are equal, round, and reactive to light.   Cardiovascular:      Rate and Rhythm: Normal rate and regular rhythm.      Pulses: Normal pulses.      Heart sounds: Normal heart sounds. No murmur heard.     No friction rub. No gallop.   Pulmonary:      Effort: Pulmonary effort is normal.      Breath sounds: Normal breath sounds. No wheezing or rhonchi.   Skin:     General: Skin is warm and dry.   Neurological:      Mental Status: She is alert and oriented to person, place, and time.      Cranial Nerves: No cranial nerve deficit.   Psychiatric:         Mood and Affect: Mood is anxious.         Behavior: Behavior normal.         Thought Content: Thought content normal.         Judgment: Judgment normal.          Result Review        No Images in the past 120 days found..      The above data has been reviewed by SHELIA Gil 03/19/2024 09:51 EDT.          Patient Care Team:  Jerica Moody APRN as PCP - General " (Family Medicine)  Tavon Ospina MD as Consulting Physician (Obstetrics and Gynecology)    BMI is within normal parameters. No other follow-up for BMI required.       ASSESSMENT & PLAN    Diagnoses and all orders for this visit:    1. Annual physical exam (Primary)  -     Comprehensive Metabolic Panel; Future  -     CBC & Differential; Future  -     Lipid Panel; Future  -     TSH Rfx On Abnormal To Free T4; Future    2. Anxiety  Comments:  GeneSight probe completed in office today.  Orders:  -     busPIRone (BUSPAR) 10 MG tablet; Take 1 tablet by mouth 3 times a day.  Dispense: 270 tablet; Refill: 1  -     GeneSight - Swab,; Future    3. Vitamin D deficiency  -     Vitamin D,25-Hydroxy; Future    4. Other fatigue  -     Iron Profile; Future  -     Vitamin B12 & Folate; Future  -     Ferritin; Future    5. Encounter for hepatitis C screening test for low risk patient  -     Hepatitis C antibody; Future       The patient is advised to continue current medications, continue current healthy lifestyle patterns, and return for routine annual checkups.    Discussed with patient the need for GeneSight probe due to several medication failures.  Patient agrees to this plan.  Will follow-up in 3 weeks to review GeneSight results and discuss further medication management.    Tobacco Use: Low Risk  (3/19/2024)    Patient History     Smoking Tobacco Use: Never     Smokeless Tobacco Use: Never     Passive Exposure: Never       Follow Up     Return in about 3 weeks (around 4/9/2024), or if symptoms worsen or fail to improve.      Patient was given instructions and counseling regarding her condition or for health maintenance advice. Please see specific information pulled into the AVS if appropriate.   I have reviewed information obtained and documented by others and I have confirmed the accuracy of this documented note.    SHELIA Gil

## 2024-03-21 ENCOUNTER — LAB (OUTPATIENT)
Dept: LAB | Facility: HOSPITAL | Age: 34
End: 2024-03-21
Payer: COMMERCIAL

## 2024-03-21 DIAGNOSIS — R53.83 OTHER FATIGUE: ICD-10-CM

## 2024-03-21 DIAGNOSIS — Z11.59 ENCOUNTER FOR HEPATITIS C SCREENING TEST FOR LOW RISK PATIENT: ICD-10-CM

## 2024-03-21 DIAGNOSIS — E55.9 VITAMIN D DEFICIENCY: ICD-10-CM

## 2024-03-21 DIAGNOSIS — Z00.00 ANNUAL PHYSICAL EXAM: ICD-10-CM

## 2024-03-21 LAB
25(OH)D3 SERPL-MCNC: 22.5 NG/ML (ref 30–100)
ALBUMIN SERPL-MCNC: 4.4 G/DL (ref 3.5–5.2)
ALBUMIN/GLOB SERPL: 2.1 G/DL
ALP SERPL-CCNC: 50 U/L (ref 39–117)
ALT SERPL W P-5'-P-CCNC: 10 U/L (ref 1–33)
ANION GAP SERPL CALCULATED.3IONS-SCNC: 10.4 MMOL/L (ref 5–15)
AST SERPL-CCNC: 15 U/L (ref 1–32)
BASOPHILS # BLD AUTO: 0.04 10*3/MM3 (ref 0–0.2)
BASOPHILS NFR BLD AUTO: 0.7 % (ref 0–1.5)
BILIRUB SERPL-MCNC: 0.3 MG/DL (ref 0–1.2)
BUN SERPL-MCNC: 9 MG/DL (ref 6–20)
BUN/CREAT SERPL: 10.6 (ref 7–25)
CALCIUM SPEC-SCNC: 8.9 MG/DL (ref 8.6–10.5)
CHLORIDE SERPL-SCNC: 103 MMOL/L (ref 98–107)
CHOLEST SERPL-MCNC: 182 MG/DL (ref 0–200)
CO2 SERPL-SCNC: 23.6 MMOL/L (ref 22–29)
CREAT SERPL-MCNC: 0.85 MG/DL (ref 0.57–1)
DEPRECATED RDW RBC AUTO: 36.3 FL (ref 37–54)
EGFRCR SERPLBLD CKD-EPI 2021: 92.3 ML/MIN/1.73
EOSINOPHIL # BLD AUTO: 0.07 10*3/MM3 (ref 0–0.4)
EOSINOPHIL NFR BLD AUTO: 1.2 % (ref 0.3–6.2)
ERYTHROCYTE [DISTWIDTH] IN BLOOD BY AUTOMATED COUNT: 11.7 % (ref 12.3–15.4)
FERRITIN SERPL-MCNC: 23.2 NG/ML (ref 13–150)
FOLATE SERPL-MCNC: 14.5 NG/ML (ref 4.78–24.2)
GLOBULIN UR ELPH-MCNC: 2.1 GM/DL
GLUCOSE SERPL-MCNC: 77 MG/DL (ref 65–99)
HCT VFR BLD AUTO: 38.1 % (ref 34–46.6)
HCV AB SER DONR QL: NORMAL
HDLC SERPL-MCNC: 63 MG/DL (ref 40–60)
HGB BLD-MCNC: 12.6 G/DL (ref 12–15.9)
IMM GRANULOCYTES # BLD AUTO: 0.01 10*3/MM3 (ref 0–0.05)
IMM GRANULOCYTES NFR BLD AUTO: 0.2 % (ref 0–0.5)
IRON 24H UR-MRATE: 73 MCG/DL (ref 37–145)
IRON SATN MFR SERPL: 20 % (ref 20–50)
LDLC SERPL CALC-MCNC: 109 MG/DL (ref 0–100)
LDLC/HDLC SERPL: 1.72 {RATIO}
LYMPHOCYTES # BLD AUTO: 1.96 10*3/MM3 (ref 0.7–3.1)
LYMPHOCYTES NFR BLD AUTO: 33.5 % (ref 19.6–45.3)
MCH RBC QN AUTO: 28.3 PG (ref 26.6–33)
MCHC RBC AUTO-ENTMCNC: 33.1 G/DL (ref 31.5–35.7)
MCV RBC AUTO: 85.6 FL (ref 79–97)
MONOCYTES # BLD AUTO: 0.46 10*3/MM3 (ref 0.1–0.9)
MONOCYTES NFR BLD AUTO: 7.9 % (ref 5–12)
NEUTROPHILS NFR BLD AUTO: 3.31 10*3/MM3 (ref 1.7–7)
NEUTROPHILS NFR BLD AUTO: 56.5 % (ref 42.7–76)
NRBC BLD AUTO-RTO: 0 /100 WBC (ref 0–0.2)
PLATELET # BLD AUTO: 213 10*3/MM3 (ref 140–450)
PMV BLD AUTO: 11.1 FL (ref 6–12)
POTASSIUM SERPL-SCNC: 3.6 MMOL/L (ref 3.5–5.2)
PROT SERPL-MCNC: 6.5 G/DL (ref 6–8.5)
RBC # BLD AUTO: 4.45 10*6/MM3 (ref 3.77–5.28)
SODIUM SERPL-SCNC: 137 MMOL/L (ref 136–145)
TIBC SERPL-MCNC: 365 MCG/DL (ref 298–536)
TRANSFERRIN SERPL-MCNC: 245 MG/DL (ref 200–360)
TRIGL SERPL-MCNC: 54 MG/DL (ref 0–150)
TSH SERPL DL<=0.05 MIU/L-ACNC: 1.61 UIU/ML (ref 0.27–4.2)
VIT B12 BLD-MCNC: 554 PG/ML (ref 211–946)
VLDLC SERPL-MCNC: 10 MG/DL (ref 5–40)
WBC NRBC COR # BLD AUTO: 5.85 10*3/MM3 (ref 3.4–10.8)

## 2024-03-21 PROCEDURE — 82607 VITAMIN B-12: CPT

## 2024-03-21 PROCEDURE — 82306 VITAMIN D 25 HYDROXY: CPT

## 2024-03-21 PROCEDURE — 84466 ASSAY OF TRANSFERRIN: CPT

## 2024-03-21 PROCEDURE — 86803 HEPATITIS C AB TEST: CPT

## 2024-03-21 PROCEDURE — 36415 COLL VENOUS BLD VENIPUNCTURE: CPT

## 2024-03-21 PROCEDURE — 82728 ASSAY OF FERRITIN: CPT

## 2024-03-21 PROCEDURE — 80050 GENERAL HEALTH PANEL: CPT

## 2024-03-21 PROCEDURE — 80061 LIPID PANEL: CPT

## 2024-03-21 PROCEDURE — 83540 ASSAY OF IRON: CPT

## 2024-03-21 PROCEDURE — 82746 ASSAY OF FOLIC ACID SERUM: CPT

## 2024-03-25 RX ORDER — ERGOCALCIFEROL 1.25 MG/1
50000 CAPSULE ORAL WEEKLY
Qty: 12 CAPSULE | Refills: 1 | Status: SHIPPED | OUTPATIENT
Start: 2024-03-25

## 2024-04-08 ENCOUNTER — TELEPHONE (OUTPATIENT)
Dept: OBSTETRICS AND GYNECOLOGY | Facility: CLINIC | Age: 34
End: 2024-04-08
Payer: COMMERCIAL

## 2024-04-08 DIAGNOSIS — Z34.90 PREGNANCY WITH FETUS OF UNKNOWN GESTATIONAL AGE: Primary | ICD-10-CM

## 2024-04-08 DIAGNOSIS — O36.80X0 PREGNANCY OF UNKNOWN ANATOMIC LOCATION: ICD-10-CM

## 2024-04-09 ENCOUNTER — TELEMEDICINE (OUTPATIENT)
Dept: FAMILY MEDICINE CLINIC | Facility: CLINIC | Age: 34
End: 2024-04-09
Payer: COMMERCIAL

## 2024-04-09 VITALS — SYSTOLIC BLOOD PRESSURE: 110 MMHG | DIASTOLIC BLOOD PRESSURE: 70 MMHG

## 2024-04-09 DIAGNOSIS — Z34.90 PREGNANCY, UNSPECIFIED GESTATIONAL AGE: ICD-10-CM

## 2024-04-09 DIAGNOSIS — F41.9 ANXIETY: Primary | ICD-10-CM

## 2024-04-09 NOTE — PROGRESS NOTES
Chief Complaint  Follow-up (GeneSight DNA results), Anxiety, and Depression    You have chosen to receive care through a telehealth visit.  Do you consent to use a video/audio connection for your medical care today? Yes  Pt location is home, provider location is [facility] clinic.    Anxiety/Depression:  Patient is taking Buspirone - new medication at last office visit.  Patient was doing well at most times on medication but had to stop taking it due to finding out that she is pregnant.  Patient denies suicidal ideations or thoughts of harming herself or others.      SUBJECTIVE  Sera Hoff presents to [facility]    History of Present Illness  Past Medical History:   Diagnosis Date    Anxiety     Asthma due to environmental allergies     Depression     Endometriosis     Ovarian cyst       Family History   Problem Relation Age of Onset    Prostate cancer Father     Hypertension Father     Hypertension Mother     Prostate cancer Paternal Grandfather     Diabetes Paternal Grandmother     Hypertension Paternal Grandmother     Stroke Maternal Grandmother     Hypertension Maternal Grandmother     Stroke Maternal Grandfather     Diabetes Maternal Grandfather     Hypertension Maternal Grandfather     Stroke Other         uncle    Diabetes Other         uncle    Breast cancer Neg Hx     Ovarian cancer Neg Hx     Uterine cancer Neg Hx     Colon cancer Neg Hx     Melanoma Neg Hx       Past Surgical History:   Procedure Laterality Date    APPENDECTOMY      OTHER SURGICAL HISTORY      ENDOMETRIOSIS EXCISION    WISDOM TOOTH EXTRACTION          Current Outpatient Medications:     busPIRone (BUSPAR) 10 MG tablet, Take 1 tablet by mouth 3 times a day., Disp: 270 tablet, Rfl: 1    Cetirizine HCl (ZyrTEC Allergy) 10 MG capsule, Zyrtec 10 mg oral capsule take 1 capsule by oral route daily   Suspended, Disp: , Rfl:     fluticasone (FLONASE) 50 MCG/ACT nasal spray, 2 sprays into the nostril(s) as  "directed by provider Daily., Disp: , Rfl:     vitamin D (ERGOCALCIFEROL) 1.25 MG (56794 UT) capsule capsule, Take 1 capsule by mouth 1 (One) Time Per Week., Disp: 12 capsule, Rfl: 1    Prenat MV-Min w/Fe-Folate-DHA (PRENATAL COMPLETE PO), Take 1 tablet by mouth Daily., Disp: , Rfl:     OBJECTIVE  Vital Signs:   /70 (BP Location: Left arm, Patient Position: Sitting, Cuff Size: Large Adult)    Estimated body mass index is 20.01 kg/m² as calculated from the following:    Height as of 3/19/24: 167.6 cm (66\").    Weight as of 3/19/24: 56.2 kg (124 lb).     Wt Readings from Last 3 Encounters:   03/19/24 56.2 kg (124 lb)   09/07/23 57.2 kg (126 lb)   08/16/23 57.6 kg (127 lb)     BP Readings from Last 3 Encounters:   04/09/24 110/70   03/19/24 114/72   09/07/23 112/76       Physical Exam  Constitutional:       Appearance: Normal appearance.   HENT:      Head: Normocephalic and atraumatic.      Right Ear: External ear normal.      Left Ear: External ear normal.      Nose: Nose normal.   Pulmonary:      Effort: Pulmonary effort is normal.   Neurological:      Mental Status: She is alert.   Psychiatric:         Mood and Affect: Mood normal.         Behavior: Behavior normal.         Thought Content: Thought content normal.         Judgment: Judgment normal.          Result Review        No Images in the past 120 days found..      The above data has been reviewed by SHELIA iGl 04/09/2024 10:44 EDT.          Patient Care Team:  Jerica Moody APRN as PCP - General (Family Medicine)  Tavon Ospina MD as Consulting Physician (Obstetrics and Gynecology)    BMI is within normal parameters. No other follow-up for BMI required.       ASSESSMENT & PLAN    Diagnoses and all orders for this visit:    1. Anxiety (Primary)  Comments:  May continue to use BuSpar as needed for anxiety symptoms.    2. Pregnancy, unspecified gestational age  Comments:  Continue prenatal vitamin and follow-up with OB provider     "     Tobacco Use: Low Risk  (4/9/2024)    Patient History     Smoking Tobacco Use: Never     Smokeless Tobacco Use: Never     Passive Exposure: Never       Follow Up     Return if symptoms worsen or fail to improve.      Patient was given instructions and counseling regarding her condition or for health maintenance advice. Please see specific information pulled into the AVS if appropriate.   I have reviewed information obtained and documented by others and I have confirmed the accuracy of this documented note.    Jerica Moody, APRN

## 2024-04-15 ENCOUNTER — LAB (OUTPATIENT)
Dept: LAB | Facility: HOSPITAL | Age: 34
End: 2024-04-15
Payer: COMMERCIAL

## 2024-04-15 DIAGNOSIS — Z34.90 PREGNANCY WITH FETUS OF UNKNOWN GESTATIONAL AGE: ICD-10-CM

## 2024-04-15 LAB — HCG INTACT+B SERPL-ACNC: NORMAL MIU/ML

## 2024-04-15 PROCEDURE — 84702 CHORIONIC GONADOTROPIN TEST: CPT

## 2024-04-15 PROCEDURE — 36415 COLL VENOUS BLD VENIPUNCTURE: CPT

## 2024-04-16 DIAGNOSIS — Z34.90 PREGNANCY WITH FETUS OF UNKNOWN GESTATIONAL AGE: Primary | ICD-10-CM

## 2024-05-13 ENCOUNTER — INITIAL PRENATAL (OUTPATIENT)
Dept: OBSTETRICS AND GYNECOLOGY | Facility: CLINIC | Age: 34
End: 2024-05-13

## 2024-05-13 VITALS — DIASTOLIC BLOOD PRESSURE: 72 MMHG | SYSTOLIC BLOOD PRESSURE: 106 MMHG | WEIGHT: 121.4 LBS | BODY MASS INDEX: 19.59 KG/M2

## 2024-05-13 DIAGNOSIS — Z34.81 PRENATAL CARE, SUBSEQUENT PREGNANCY IN FIRST TRIMESTER: ICD-10-CM

## 2024-05-13 DIAGNOSIS — O21.9 NAUSEA AND VOMITING DURING PREGNANCY: ICD-10-CM

## 2024-05-13 DIAGNOSIS — Z34.80 SUPERVISION OF OTHER NORMAL PREGNANCY, ANTEPARTUM: Primary | ICD-10-CM

## 2024-05-13 LAB
ABO GROUP BLD: NORMAL
AMPHET+METHAMPHET UR QL: NEGATIVE
BARBITURATES UR QL SCN: NEGATIVE
BASOPHILS # BLD AUTO: 0.01 10*3/MM3 (ref 0–0.2)
BASOPHILS NFR BLD AUTO: 0.1 % (ref 0–1.5)
BENZODIAZ UR QL SCN: NEGATIVE
BLD GP AB SCN SERPL QL: NEGATIVE
CANNABINOIDS SERPL QL: NEGATIVE
COCAINE UR QL: NEGATIVE
DEPRECATED RDW RBC AUTO: 40.5 FL (ref 37–54)
EOSINOPHIL # BLD AUTO: 0.04 10*3/MM3 (ref 0–0.4)
EOSINOPHIL NFR BLD AUTO: 0.5 % (ref 0.3–6.2)
ERYTHROCYTE [DISTWIDTH] IN BLOOD BY AUTOMATED COUNT: 13.1 % (ref 12.3–15.4)
EXTERNAL GROUP B STREP ANTIGEN: POSITIVE
FENTANYL UR-MCNC: NEGATIVE NG/ML
GLUCOSE UR STRIP-MCNC: NEGATIVE MG/DL
HBV SURFACE AG SERPL QL IA: NORMAL
HCT VFR BLD AUTO: 37.8 % (ref 34–46.6)
HCV AB SER QL: NORMAL
HGB BLD-MCNC: 12.6 G/DL (ref 12–15.9)
HIV 1+2 AB+HIV1 P24 AG SERPL QL IA: NORMAL
IMM GRANULOCYTES # BLD AUTO: 0.03 10*3/MM3 (ref 0–0.05)
IMM GRANULOCYTES NFR BLD AUTO: 0.4 % (ref 0–0.5)
LYMPHOCYTES # BLD AUTO: 1.25 10*3/MM3 (ref 0.7–3.1)
LYMPHOCYTES NFR BLD AUTO: 15.7 % (ref 19.6–45.3)
MCH RBC QN AUTO: 28.4 PG (ref 26.6–33)
MCHC RBC AUTO-ENTMCNC: 33.3 G/DL (ref 31.5–35.7)
MCV RBC AUTO: 85.1 FL (ref 79–97)
METHADONE UR QL SCN: NEGATIVE
MONOCYTES # BLD AUTO: 0.48 10*3/MM3 (ref 0.1–0.9)
MONOCYTES NFR BLD AUTO: 6 % (ref 5–12)
NEUTROPHILS NFR BLD AUTO: 6.16 10*3/MM3 (ref 1.7–7)
NEUTROPHILS NFR BLD AUTO: 77.3 % (ref 42.7–76)
NRBC BLD AUTO-RTO: 0 /100 WBC (ref 0–0.2)
OPIATES UR QL: NEGATIVE
OXYCODONE UR QL SCN: NEGATIVE
PLATELET # BLD AUTO: 189 10*3/MM3 (ref 140–450)
PMV BLD AUTO: 11.2 FL (ref 6–12)
PROT UR STRIP-MCNC: NEGATIVE MG/DL
RBC # BLD AUTO: 4.44 10*6/MM3 (ref 3.77–5.28)
RH BLD: POSITIVE
T PALLIDUM IGG SER QL: NORMAL
WBC NRBC COR # BLD AUTO: 7.97 10*3/MM3 (ref 3.4–10.8)

## 2024-05-13 PROCEDURE — 86803 HEPATITIS C AB TEST: CPT | Performed by: NURSE PRACTITIONER

## 2024-05-13 PROCEDURE — 86850 RBC ANTIBODY SCREEN: CPT | Performed by: NURSE PRACTITIONER

## 2024-05-13 PROCEDURE — 87340 HEPATITIS B SURFACE AG IA: CPT | Performed by: NURSE PRACTITIONER

## 2024-05-13 PROCEDURE — 80307 DRUG TEST PRSMV CHEM ANLYZR: CPT | Performed by: NURSE PRACTITIONER

## 2024-05-13 PROCEDURE — 87661 TRICHOMONAS VAGINALIS AMPLIF: CPT | Performed by: NURSE PRACTITIONER

## 2024-05-13 PROCEDURE — 83020 HEMOGLOBIN ELECTROPHORESIS: CPT | Performed by: NURSE PRACTITIONER

## 2024-05-13 PROCEDURE — 87591 N.GONORRHOEAE DNA AMP PROB: CPT | Performed by: NURSE PRACTITIONER

## 2024-05-13 PROCEDURE — G0432 EIA HIV-1/HIV-2 SCREEN: HCPCS | Performed by: NURSE PRACTITIONER

## 2024-05-13 PROCEDURE — 87147 CULTURE TYPE IMMUNOLOGIC: CPT | Performed by: NURSE PRACTITIONER

## 2024-05-13 PROCEDURE — 87491 CHLMYD TRACH DNA AMP PROBE: CPT | Performed by: NURSE PRACTITIONER

## 2024-05-13 PROCEDURE — 86900 BLOOD TYPING SEROLOGIC ABO: CPT | Performed by: NURSE PRACTITIONER

## 2024-05-13 PROCEDURE — 87086 URINE CULTURE/COLONY COUNT: CPT | Performed by: NURSE PRACTITIONER

## 2024-05-13 PROCEDURE — 86762 RUBELLA ANTIBODY: CPT | Performed by: NURSE PRACTITIONER

## 2024-05-13 PROCEDURE — 99214 OFFICE O/P EST MOD 30 MIN: CPT | Performed by: NURSE PRACTITIONER

## 2024-05-13 PROCEDURE — 86780 TREPONEMA PALLIDUM: CPT | Performed by: NURSE PRACTITIONER

## 2024-05-13 PROCEDURE — 85025 COMPLETE CBC W/AUTO DIFF WBC: CPT | Performed by: NURSE PRACTITIONER

## 2024-05-13 PROCEDURE — 86901 BLOOD TYPING SEROLOGIC RH(D): CPT | Performed by: NURSE PRACTITIONER

## 2024-05-13 RX ORDER — PROMETHAZINE HYDROCHLORIDE 25 MG/1
25 TABLET ORAL EVERY 6 HOURS PRN
Qty: 30 TABLET | Refills: 0 | Status: SHIPPED | OUTPATIENT
Start: 2024-05-13

## 2024-05-13 NOTE — PROGRESS NOTES
OB Initial Visit    CC- Here for care of current pregnancy, first visit    Subjective:  33 y.o.  presenting for her first obstetrical visit.    LMP: Patient's last menstrual period was 2024 (exact date).     Pt complains of nausea and vomiting    Happy for pregnancy.     Reviewed and updated:  OBHx, GYNHx (STDs), PMHx, Medications, Allergies, PSHx, Social Hx, Preventative Hx (PAP), Hx of abuse/safe environment, Vaccine Hx including hx of chickenpox or vaccine, Genetic Hx (pt, FOB, both families).        Objective:  /72   Wt 55.1 kg (121 lb 6.4 oz)   LMP 2024 (Exact Date)   BMI 19.59 kg/m²         General- NAD, alert and oriented, appropriate  Psych- Normal mood, good memory  Neck- No masses, no thyroid enlargement  CV- Regular rhythm, no murnurs  Resp- CTA to bases, no wheezes  Abdomen- Soft, non distended, non tender, no masses    Breast left- deferred  Breast right- deferred    Pelvic exam deferred  Uterus- Normal shape and consistency, non tender, Consistent with dates, Bedside US consistent with dates.  -160..        Lymphatic- No palpable neck, axillary, nodes  Ext- No edema, no cyanosis    Skin- No lesions, no rashes, no acanthosis nigricans    Assessment and Plan:  9w4d  Diagnoses and all orders for this visit:    1. Supervision of other normal pregnancy, antepartum (Primary)  Overview:  CELESTINO finalized: 24 per LMP, 7-week US and ACOG    Optional testing NIPS,CF/SMA,AFP:  CF/SMA pending, NIPS pending    COVID: Fully vaccinated  Flu:  Tdap:  RSV:    Rhogam:  28-32 weeks repeat TPA:  ? Desires Sterilization:    Anatomy US:  FU US:    PROBLEM LIST/PLAN:   N/V - taking B6, doxylamine, phenergan        Orders:  -     POC Urinalysis Dipstick  -     Cancel: IGP,CtNgTv,Apt HPV,rfx 16 / 18,45  -     OB Panel With HIV  -     Urine Culture - Urine, Urine, Clean Catch  -     Urine Drug Screen - Urine, Clean Catch  -     Hemoglobinopathy Fractionation Cascade  -     Cancel: POC  Pregnancy, Urine  -     [id number] PLUS Core+SCA+ESS - Blood,  -     Inheritest (R) CF/SMA Panel - Blood,  -     Chlamydia trachomatis, Neisseria gonorrhoeae, Trichomonas vaginalis, PCR - Urine, Urine, Clean Catch    2. Prenatal care, subsequent pregnancy in first trimester  -     [id number] PLUS Core+SCA+ESS - Blood,  -     Inheritest (R) CF/SMA Panel - Blood,    3. Nausea and vomiting during pregnancy  Overview:  Is taking B6 and doxylamine, will add phenergan as needed.    Orders:  -     promethazine (PHENERGAN) 25 MG tablet; Take 1 tablet by mouth Every 6 (Six) Hours As Needed for Nausea or Vomiting.  Dispense: 30 tablet; Refill: 0        Genetic Screening:   CF  NIPS  AFP only    Vaccines:   s/p COVID vaccine    Counseling:   Nutrition discussed, calories, activity/exercise in pregnancy  Discussed dietary restrictions/safety food preparation in pregnancy  Reviewed what to expect prenatal visits, office providers (female and male) and covering [facility]/Dr. Dominguez  Appropriate trimester precautions provided, N/V, vag bleeding, cramping  Questions answered    Labs:   Prenatal labs, cultures, and PAP performed (prn)    Return in about 4 weeks (around 6/10/2024) for [facility] Office, OB follow up, Dr. Lewis.      Juliocesar Norwood, APRN  05/13/2024    [facility] [place] [facility] [address]  [facility]  [address] [place] [address]  Dept: [phone]  Dept Fax: [phone]  Loc: [phone]

## 2024-05-15 ENCOUNTER — TELEPHONE (OUTPATIENT)
Dept: OBSTETRICS AND GYNECOLOGY | Facility: CLINIC | Age: 34
End: 2024-05-15

## 2024-05-15 DIAGNOSIS — B95.1 GROUP B STREPTOCOCCUS URINARY TRACT INFECTION AFFECTING PREGNANCY, ANTEPARTUM: Primary | ICD-10-CM

## 2024-05-15 DIAGNOSIS — O23.40 GROUP B STREPTOCOCCUS URINARY TRACT INFECTION AFFECTING PREGNANCY, ANTEPARTUM: Primary | ICD-10-CM

## 2024-05-15 PROBLEM — Z28.39 RUBELLA NON-IMMUNE STATUS, ANTEPARTUM: Status: ACTIVE | Noted: 2024-05-15

## 2024-05-15 PROBLEM — O09.899 RUBELLA NON-IMMUNE STATUS, ANTEPARTUM: Status: ACTIVE | Noted: 2024-05-15

## 2024-05-15 LAB
BACTERIA SPEC AEROBE CULT: ABNORMAL
HGB A MFR BLD ELPH: 97 % (ref 96.4–98.8)
HGB A2 MFR BLD ELPH: 3 % (ref 1.8–3.2)
HGB F MFR BLD ELPH: 0 % (ref 0–2)
HGB FRACT BLD-IMP: NORMAL
HGB S MFR BLD ELPH: 0 %
RUBV IGG SERPL IA-ACNC: <0.9 INDEX

## 2024-05-15 RX ORDER — CEPHALEXIN 500 MG/1
500 CAPSULE ORAL 2 TIMES DAILY
Qty: 20 CAPSULE | Refills: 0 | Status: SHIPPED | OUTPATIENT
Start: 2024-05-15 | End: 2024-05-25

## 2024-05-15 NOTE — TELEPHONE ENCOUNTER
Provider: DR MCLEOD     Caller: JOSE BELL    Pharmacy: [facility] #[id number]    Phone Number: [phone]    Reason for Call: PATIENT IS CALLING WITH CONCERNS BECAUSE SHE THINKS SHE HAS A UTI//SHE HAS THE FREQUENT URGE AND IT BURNS//PATIENT STATED THE SHE HAS UTI'S AND THEY PROGRESS PRETTY QUICK//HUB WASNT ABLE TO TO CONTACT CLINICAL LINE SHE SHE IS OB PATIENT//PLEASE FOLLOW UP

## 2024-05-15 NOTE — PROGRESS NOTES
Patient aware of results patient voiced she is unsure if she has taken cephalexin in the past or not the PCN allergy was when she was a baby if fine with taking the medication and will let us know if she starts to have an reaction to it.

## 2024-05-16 ENCOUNTER — TELEPHONE (OUTPATIENT)
Dept: OBSTETRICS AND GYNECOLOGY | Facility: CLINIC | Age: 34
End: 2024-05-16

## 2024-05-16 LAB
C TRACH RRNA SPEC QL NAA+PROBE: NEGATIVE
N GONORRHOEA RRNA SPEC QL NAA+PROBE: NEGATIVE
T VAGINALIS RRNA SPEC QL NAA+PROBE: NEGATIVE

## 2024-05-20 ENCOUNTER — TELEPHONE (OUTPATIENT)
Dept: OBSTETRICS AND GYNECOLOGY | Facility: CLINIC | Age: 34
End: 2024-05-20

## 2024-05-20 RX ORDER — PYRIDOXINE HCL (VITAMIN B6) 25 MG
25 TABLET ORAL EVERY 8 HOURS
Qty: 90 TABLET | Refills: 2 | Status: SHIPPED | OUTPATIENT
Start: 2024-05-20

## 2024-05-20 NOTE — TELEPHONE ENCOUNTER
Patient is having nausea and vomiting can you please send in nausea medication to [facility] patient does not want zofran. Once received back will contact patient as well as get her follow up appointment made looks like one was not schedule when patient was in office.

## 2024-05-20 NOTE — TELEPHONE ENCOUNTER
I have sent B6 and doxylamine to her pharmacy.  She should already have a prescription for promethazine at her pharmacy as well.

## 2024-05-29 LAB
CITATION REF LAB TEST: NORMAL
ETHNIC BACKGROUND STATED: NORMAL
GENE DIS ANL CARRIER INTERP-IMP: NORMAL
GENE STUDIED ID: NORMAL
LAB DIRECTOR NAME PROVIDER: NORMAL
Lab: NORMAL
MOL DX INTERP BLD/T QL: NORMAL
REASON FOR REFERRAL (NARRATIVE): NORMAL
RECOMMENDATION PATIENT DOC-IMP: NORMAL
REF LAB TEST METHOD: NORMAL
SERVICE CMNT-IMP: NORMAL
SPECIMEN SOURCE: NORMAL

## 2024-06-06 ENCOUNTER — TELEPHONE (OUTPATIENT)
Dept: OBSTETRICS AND GYNECOLOGY | Facility: CLINIC | Age: 34
End: 2024-06-06
Payer: COMMERCIAL

## 2024-06-06 ENCOUNTER — CLINICAL SUPPORT (OUTPATIENT)
Dept: OBSTETRICS AND GYNECOLOGY | Facility: CLINIC | Age: 34
End: 2024-06-06
Payer: COMMERCIAL

## 2024-06-06 DIAGNOSIS — R35.0 URINE FREQUENCY: ICD-10-CM

## 2024-06-06 DIAGNOSIS — R30.0 DYSURIA: ICD-10-CM

## 2024-06-06 DIAGNOSIS — R30.9 URINARY PAIN: Primary | ICD-10-CM

## 2024-06-06 PROCEDURE — 87086 URINE CULTURE/COLONY COUNT: CPT | Performed by: OBSTETRICS & GYNECOLOGY

## 2024-06-06 RX ORDER — CEPHALEXIN 500 MG/1
500 CAPSULE ORAL 2 TIMES DAILY
Qty: 14 CAPSULE | Refills: 0 | Status: SHIPPED | OUTPATIENT
Start: 2024-06-06

## 2024-06-06 NOTE — PROGRESS NOTES
OB FOLLOW UP      Chief Complaint   Patient presents with    Routine Prenatal Visit     Subjective:   New patient to me  Still nausea w B6 and unisom, starting to improve some. Declines new meds    Objective:  /85   Wt 53.1 kg (117 lb)   LMP 03/07/2024 (Exact Date)   BMI 18.88 kg/m²  -3.629 kg (-8 lb) Facility age limit for growth %migel is 20 years.  See OB flow sheet for fundal height (not performed if US day of OV), FHT, edema, cvx exam if performed, and Upro/Uglu  Chaperone present during pelvic exam if performed.      Assessment and Plan:  13w6d     Diagnoses and all orders for this visit:    1. Supervision of other normal pregnancy, antepartum (Primary)  Overview:  CELESTINO finalized: 12/12/24 per LMP, 7-week US and ACOG    CF/SMA negative, NIPS negative XX.  Declines AFP 6/12/2024     COVID: Recommended 6/12/2024   Tdap:  RSV:  Flu:    28-32 weeks repeat TPA:  ? Desires Sterilization:    Anatomy US: ordered 6/12/2024   FU US:    PROBLEM LIST/PLAN:   N/V - taking B6, doxylamine, phenergan.  6/12/2024 improved  Depression/anxiety-no medications.  Stable    +GBS urine culture - 5/15/24, treated,  pt remained symptomatic and retreated w keflex    DAMIAN NEG  Rubella non-immune - plan vaccine postpartum    Orders:  -     POC Urinalysis Dipstick  -     US Ob Detail Fetal Anatomy Single or First Gestation; Future      Counseling:    Second trimester precautions  VACCINE importance in pregnancy discussed.  Maternal and fetal risk of not being vaccinated reviewed NLT increased risk maternal/fetal severity of illness/death, PTD, CS, hemorrhage, HTN, possible IUGR and/or IUFD.  Maternal and fetal/infant benefit w vaccination.  FDA approval and ACOG/SMFM/CDC recommendation in pregnancy.  Questions answered.       Reassuring pregnancy progress. Questions answered.  Continue PNV.  Importance of healthy eating, obtaining sufficient sleep, and staying active unless hypertensive- activity modified as directed.    Return in  about 4 weeks (around 7/10/2024) for FU OB x2, second OV w anatomy US.            Luci Lewis, DO  06/12/2024    [facility] [place] [facility]  [address] [place] [address]  Dept: [phone]  Dept Fax: [phone]  Loc: [phone]  Loc Fax: [phone]

## 2024-06-06 NOTE — TELEPHONE ENCOUNTER
Patient called stating she was treated for a UTI 5/15. She finished Keflex as directed and symptoms cleared. She started having burning with urination with frequency and urgency yesterday. She is requesting to get a refill sent to [facility] Please advise.

## 2024-06-06 NOTE — TELEPHONE ENCOUNTER
Patient aware of recommendation for culture and that a script that has been sent. She will stop by this afternoon for collection. She will hold off on starting the medication until culture provided. Please sign the attached order for urine culture to be collected.

## 2024-06-08 LAB — BACTERIA SPEC AEROBE CULT: NO GROWTH

## 2024-06-12 ENCOUNTER — ROUTINE PRENATAL (OUTPATIENT)
Dept: OBSTETRICS AND GYNECOLOGY | Facility: CLINIC | Age: 34
End: 2024-06-12
Payer: COMMERCIAL

## 2024-06-12 VITALS — BODY MASS INDEX: 18.88 KG/M2 | WEIGHT: 117 LBS | SYSTOLIC BLOOD PRESSURE: 123 MMHG | DIASTOLIC BLOOD PRESSURE: 85 MMHG

## 2024-06-12 DIAGNOSIS — Z34.80 SUPERVISION OF OTHER NORMAL PREGNANCY, ANTEPARTUM: Primary | ICD-10-CM

## 2024-06-12 LAB
GLUCOSE UR STRIP-MCNC: NEGATIVE MG/DL
PROT UR STRIP-MCNC: NEGATIVE MG/DL

## 2024-06-13 ENCOUNTER — TELEPHONE (OUTPATIENT)
Dept: OBSTETRICS AND GYNECOLOGY | Facility: CLINIC | Age: 34
End: 2024-06-13
Payer: COMMERCIAL

## 2024-07-10 ENCOUNTER — ROUTINE PRENATAL (OUTPATIENT)
Dept: OBSTETRICS AND GYNECOLOGY | Facility: CLINIC | Age: 34
End: 2024-07-10
Payer: COMMERCIAL

## 2024-07-10 VITALS — WEIGHT: 118 LBS | SYSTOLIC BLOOD PRESSURE: 106 MMHG | DIASTOLIC BLOOD PRESSURE: 73 MMHG | BODY MASS INDEX: 19.05 KG/M2

## 2024-07-10 DIAGNOSIS — O21.9 NAUSEA AND VOMITING DURING PREGNANCY: ICD-10-CM

## 2024-07-10 DIAGNOSIS — O26.899 HEARTBURN DURING PREGNANCY, ANTEPARTUM: ICD-10-CM

## 2024-07-10 DIAGNOSIS — Z34.80 SUPERVISION OF OTHER NORMAL PREGNANCY, ANTEPARTUM: Primary | ICD-10-CM

## 2024-07-10 DIAGNOSIS — R12 HEARTBURN DURING PREGNANCY, ANTEPARTUM: ICD-10-CM

## 2024-07-10 LAB
GLUCOSE UR STRIP-MCNC: NEGATIVE MG/DL
PROT UR STRIP-MCNC: NEGATIVE MG/DL

## 2024-07-10 RX ORDER — FAMOTIDINE 20 MG/1
20 TABLET, FILM COATED ORAL 2 TIMES DAILY
Qty: 60 TABLET | Refills: 5 | Status: SHIPPED | OUTPATIENT
Start: 2024-07-10 | End: 2024-08-09

## 2024-07-10 NOTE — ASSESSMENT & PLAN NOTE
Doing well, having some heartburn  Starting to feel flutters  Anatomy ultrasound next visit  Second trimester precautions

## 2024-07-10 NOTE — PROGRESS NOTES
OB FOLLOW UP      Chief Complaint   Patient presents with    Routine Prenatal Visit     Subjective:   Heartburn    Objective:  /73   Wt 53.5 kg (118 lb)   LMP 03/07/2024 (Exact Date)   BMI 19.05 kg/m²  -3.175 kg (-7 lb)  Uterine Size: size equals dates, below umbilicus  FHT: 110-160 BPM    See OB flow for edema, cvx exam if performed, and Upro/Uglu    Assessment and Plan:  17w6d  Reassuring pregnancy progress.  Questions answered.  Diagnoses and all orders for this visit:    1. Supervision of other normal pregnancy, antepartum (Primary)  Overview:  CELESTINO finalized: 12/12/24 per LMP, 7-week US and ACOG    CF/SMA negative, NIPS negative XX.  Declines AFP 6/12/2024     COVID: Recommended 6/12/2024   Tdap:  RSV:  Flu:    28-32 weeks repeat TPA:  ? Desires Sterilization:    Anatomy US: ordered 6/12/2024   FU US:    PROBLEM LIST/PLAN:   N/V - taking B6, doxylamine, phenergan.  6/12/2024 improved 7/10/24 resolved  Depression/anxiety-no medications.  Stable    +GBS urine culture - 5/15/24, treated,  pt remained symptomatic and retreated w keflex    DAMIAN NEG  Rubella non-immune - plan vaccine postpartum    Heartburn  - Tums not helping, will try famotidine    Assessment & Plan:  Doing well, having some heartburn  Starting to feel flutters  Anatomy ultrasound next visit  Second trimester precautions    Orders:  -     POC Urinalysis Dipstick    2. Heartburn during pregnancy, antepartum  Overview:  States Tums does not help, will try famotidine    Orders:  -     famotidine (PEPCID) 20 MG tablet; Take 1 tablet by mouth 2 (Two) Times a Day for 30 days.  Dispense: 60 tablet; Refill: 5    3. Nausea and vomiting during pregnancy  Overview:  Is taking B6 and doxylamine, will add phenergan as needed.    Assessment & Plan:  Nausea has resolved        Counseling:    Second trimester precautions  Continue PNV.  Importance of healthy eating and staying active.    Return in about 29 days (around 8/8/2024) for Dr. Lewis, Anatomy  ultrasound, OB follow up.        JohanaSimran Norwood, APRN  07/10/2024    [facility] [place] [facility] [address]  [facility]  [address] [address]  Dept: [phone]  Dept Fax: [phone]  Loc: [phone]

## 2024-08-05 NOTE — PROGRESS NOTES
OB FOLLOW UP      Chief Complaint   Patient presents with    Routine Prenatal Visit     Subjective:   No complaints  Flying next week, max 4hr flight to [facility]    Objective:  /72   Wt 55.2 kg (121 lb 9.6 oz)   LMP 03/07/2024 (Exact Date)   BMI 19.63 kg/m²  -1.542 kg (-3 lb 6.4 oz) Body mass index is 19.63 kg/m².  See OB flow sheet for fundal height (not performed if US day of OV), FHT, edema, cvx exam if performed, and Upro/Uglu. Chaperone present during pelvic exam if performed.      Assessment and Plan:  22w4d     Diagnoses and all orders for this visit:    1. Supervision of other normal pregnancy, antepartum (Primary)  Overview:  CELESTINO finalized: 12/12/24 per LMP, 7-week US and ACOG    CF/SMA negative, NIPS negative XX.  Declined AFP    COVID: Recommended   Tdap:  RSV:  Flu:    28-32 weeks repeat TPA:  ? Desires Sterilization:    Anatomy US: [facility] 8/12/24 consistent w dates, posterior placenta, vtx  FU US:    PROBLEM LIST/PLAN:   Depression/anxiety-no medications.  Stable    +GBS urine culture - 5/15/24, treated,  pt remained symptomatic and retreated w keflex    DAMIAN NEG  Rubella non-immune - plan vaccine postpartum  N/V - taking B6, doxylamine, phenergan.  Heartburn  - Tums not helping, Rx famotidine- continue    Orders:  -     POC Urinalysis Dipstick      Counseling:    Second trimester precautions  Discussed precautions when flying and pregnant, reviewed DVT PE, staying hydrated is important    Reassuring pregnancy progress. Questions answered.  Continue PNV.  Importance of healthy eating, obtaining sufficient sleep, and staying active unless hypertensive- activity modified as directed.    Return in about 4 weeks (around 9/9/2024) for FU OB w glucola, then [id number] x2.            Luci Lewis, DO  08/12/2024    [facility] [place] [facility]  [address] [place] [address]  Dept: [phone]  Dept Fax: [phone]  Loc: [phone]  Loc Fax: [phone]

## 2024-08-12 ENCOUNTER — ROUTINE PRENATAL (OUTPATIENT)
Dept: OBSTETRICS AND GYNECOLOGY | Facility: CLINIC | Age: 34
End: 2024-08-12
Payer: COMMERCIAL

## 2024-08-12 VITALS — SYSTOLIC BLOOD PRESSURE: 108 MMHG | BODY MASS INDEX: 19.63 KG/M2 | DIASTOLIC BLOOD PRESSURE: 72 MMHG | WEIGHT: 121.6 LBS

## 2024-08-12 DIAGNOSIS — Z34.80 SUPERVISION OF OTHER NORMAL PREGNANCY, ANTEPARTUM: Primary | ICD-10-CM

## 2024-08-12 LAB
GLUCOSE UR STRIP-MCNC: NEGATIVE MG/DL
PROT UR STRIP-MCNC: NEGATIVE MG/DL

## 2024-08-12 PROCEDURE — 0502F SUBSEQUENT PRENATAL CARE: CPT | Performed by: OBSTETRICS & GYNECOLOGY

## 2024-09-09 NOTE — PROGRESS NOTES
OB FOLLOW UP      Chief Complaint   Patient presents with    Routine Prenatal Visit     Subjective:   No complaints except persistent GERD, otc pepcid didn't help    Objective:  /69   Wt 57.2 kg (126 lb)   LMP 03/07/2024 (Exact Date)   BMI 20.34 kg/m²  0.454 kg (1 lb) Body mass index is 20.34 kg/m².  See OB flow sheet for fundal height (not performed if US day of OV), FHT, edema, cvx exam if performed, and Upro/Uglu. Chaperone present during pelvic exam if performed.      Assessment and Plan:  26w5d     Diagnoses and all orders for this visit:    1. Supervision of other normal pregnancy, antepartum (Primary)  Overview:  CELESTINO finalized: 12/12/24 per LMP, 7-week US and ACOG    CF/SMA negative, NIPS negative XX.  Declined AFP    COVID: Recommended   Flu: Recommended 9/10/2024   Tdap: Recommended s/p Rx 9/10/2024   RSV:    28-32 weeks repeat TPA: 9/10/2024   ? Desires Sterilization: Declines 9/10/2024     Anatomy US: [facility] 8/12/24 consistent w dates, posterior placenta, vtx, wnl and completed  FU US:    PROBLEM LIST/PLAN:   Depression/anxiety-no medications.  Stable    +GBS urine culture - 5/15/24 treated,  pt remained symptomatic and retreated w keflex    DAMIAN NEG.  Plan to treat at delivery  Rubella non-immune - plan vaccine postpartum  N/V - taking B6, doxylamine, phenergan.  Heartburn  - Tums not helping, Rx famotidine- continue    Orders:  -     POC Urinalysis Dipstick  -     CBC (No Diff); Future  -     Gestational Diabetes Screen 1 Hour; Future  -     Treponema pallidum AB w/Reflex RPR    2. Gastroesophageal reflux disease without esophagitis  -     pantoprazole (Protonix) 20 MG EC tablet; Take 1 tablet by mouth Daily As Needed for Heartburn.  Dispense: 30 tablet; Refill: 1      Counseling:    OB precautions, leaking, VB, ernie mccormick vs PTL/Labor  FKC    Reassuring pregnancy progress. Questions answered.  Continue PNV.  Importance of healthy eating, obtaining sufficient sleep, and staying active unless  hypertensive- activity modified as directed.    Return in about 2 weeks (around 9/24/2024) for FU OB q2wks to 36weeks.            Luci Lewis,   09/10/2024    [facility]  [address] [place] [address]  Dept: [phone]  Dept Fax: [phone]  Loc: [phone]  Loc Fax: [phone]

## 2024-09-10 ENCOUNTER — ROUTINE PRENATAL (OUTPATIENT)
Dept: OBSTETRICS AND GYNECOLOGY | Facility: CLINIC | Age: 34
End: 2024-09-10
Payer: COMMERCIAL

## 2024-09-10 VITALS — BODY MASS INDEX: 20.34 KG/M2 | WEIGHT: 126 LBS | SYSTOLIC BLOOD PRESSURE: 100 MMHG | DIASTOLIC BLOOD PRESSURE: 69 MMHG

## 2024-09-10 DIAGNOSIS — Z34.80 SUPERVISION OF OTHER NORMAL PREGNANCY, ANTEPARTUM: Primary | ICD-10-CM

## 2024-09-10 DIAGNOSIS — K21.9 GASTROESOPHAGEAL REFLUX DISEASE WITHOUT ESOPHAGITIS: ICD-10-CM

## 2024-09-10 LAB
DEPRECATED RDW RBC AUTO: 37.2 FL (ref 37–54)
ERYTHROCYTE [DISTWIDTH] IN BLOOD BY AUTOMATED COUNT: 11.4 % (ref 12.3–15.4)
GLUCOSE 1H P GLC SERPL-MCNC: 103 MG/DL (ref 65–139)
GLUCOSE UR STRIP-MCNC: NEGATIVE MG/DL
HCT VFR BLD AUTO: 31.5 % (ref 34–46.6)
HGB BLD-MCNC: 10.6 G/DL (ref 12–15.9)
MCH RBC QN AUTO: 30.5 PG (ref 26.6–33)
MCHC RBC AUTO-ENTMCNC: 33.7 G/DL (ref 31.5–35.7)
MCV RBC AUTO: 90.5 FL (ref 79–97)
PLATELET # BLD AUTO: 205 10*3/MM3 (ref 140–450)
PMV BLD AUTO: 11.6 FL (ref 6–12)
PROT UR STRIP-MCNC: NEGATIVE MG/DL
RBC # BLD AUTO: 3.48 10*6/MM3 (ref 3.77–5.28)
TREPONEMA PALLIDUM IGG+IGM AB [PRESENCE] IN SERUM OR PLASMA BY IMMUNOASSAY: NORMAL
WBC NRBC COR # BLD AUTO: 11.23 10*3/MM3 (ref 3.4–10.8)

## 2024-09-10 PROCEDURE — 85027 COMPLETE CBC AUTOMATED: CPT | Performed by: OBSTETRICS & GYNECOLOGY

## 2024-09-10 PROCEDURE — 82950 GLUCOSE TEST: CPT | Performed by: OBSTETRICS & GYNECOLOGY

## 2024-09-10 PROCEDURE — 86780 TREPONEMA PALLIDUM: CPT | Performed by: OBSTETRICS & GYNECOLOGY

## 2024-09-10 PROCEDURE — 0502F SUBSEQUENT PRENATAL CARE: CPT | Performed by: OBSTETRICS & GYNECOLOGY

## 2024-09-10 RX ORDER — PANTOPRAZOLE SODIUM 20 MG/1
20 TABLET, DELAYED RELEASE ORAL DAILY PRN
Qty: 30 TABLET | Refills: 1 | Status: SHIPPED | OUTPATIENT
Start: 2024-09-10

## 2024-09-11 PROBLEM — O99.019 ANEMIA OF MOTHER IN PREGNANCY: Status: ACTIVE | Noted: 2024-09-11

## 2024-09-11 RX ORDER — FERROUS SULFATE 325(65) MG
325 TABLET ORAL EVERY OTHER DAY
Qty: 15 TABLET | Refills: 4 | Status: SHIPPED | OUTPATIENT
Start: 2024-09-11

## 2024-09-12 ENCOUNTER — TELEPHONE (OUTPATIENT)
Dept: OBSTETRICS AND GYNECOLOGY | Facility: CLINIC | Age: 34
End: 2024-09-12
Payer: COMMERCIAL

## 2024-09-12 NOTE — TELEPHONE ENCOUNTER
----- Message from Luci Lewis sent at 9/11/2024  1:37 PM EDT -----  Anemia.  Sending in Rx for iron every OTHER,  #15, 4RF.      I recommend increasing meat in diet (especially red meat), iron fortified cereals and cooking in an iron skillet.   I recommend avoiding foods that inhibit absorption of iron, eg tea, coffee, and whole grains like bran.

## 2024-09-24 ENCOUNTER — ROUTINE PRENATAL (OUTPATIENT)
Dept: OBSTETRICS AND GYNECOLOGY | Facility: CLINIC | Age: 34
End: 2024-09-24
Payer: COMMERCIAL

## 2024-09-24 VITALS — DIASTOLIC BLOOD PRESSURE: 69 MMHG | WEIGHT: 130 LBS | BODY MASS INDEX: 20.98 KG/M2 | SYSTOLIC BLOOD PRESSURE: 104 MMHG

## 2024-09-24 DIAGNOSIS — Z34.80 SUPERVISION OF OTHER NORMAL PREGNANCY, ANTEPARTUM: Primary | ICD-10-CM

## 2024-09-24 LAB
GLUCOSE UR STRIP-MCNC: NEGATIVE MG/DL
PROT UR STRIP-MCNC: NEGATIVE MG/DL

## 2024-09-24 PROCEDURE — 0502F SUBSEQUENT PRENATAL CARE: CPT | Performed by: OBSTETRICS & GYNECOLOGY

## 2024-10-07 ENCOUNTER — TELEPHONE (OUTPATIENT)
Dept: OBSTETRICS AND GYNECOLOGY | Facility: CLINIC | Age: 34
End: 2024-10-07
Payer: COMMERCIAL

## 2024-10-07 NOTE — TELEPHONE ENCOUNTER
Denied 90 ay supply on Protonix. Last seen  9/24/24.  Last filled 9/10/24 Protonix # 30 with 1 refill.  Next appointment 10/22/24

## 2024-10-08 ENCOUNTER — ROUTINE PRENATAL (OUTPATIENT)
Dept: OBSTETRICS AND GYNECOLOGY | Facility: CLINIC | Age: 34
End: 2024-10-08
Payer: COMMERCIAL

## 2024-10-08 VITALS — DIASTOLIC BLOOD PRESSURE: 62 MMHG | BODY MASS INDEX: 22.27 KG/M2 | SYSTOLIC BLOOD PRESSURE: 102 MMHG | WEIGHT: 138 LBS

## 2024-10-08 DIAGNOSIS — Z34.80 SUPERVISION OF OTHER NORMAL PREGNANCY, ANTEPARTUM: Primary | ICD-10-CM

## 2024-10-08 LAB
GLUCOSE UR STRIP-MCNC: NEGATIVE MG/DL
PROT UR STRIP-MCNC: NEGATIVE MG/DL

## 2024-10-08 NOTE — ASSESSMENT & PLAN NOTE
Doing well, having some trouble sleeping  Discussed can try doxylamine  Is taking her iron supplement  Declined flu vaccine today, wants to receive on a Friday  Fetal kick counts   labor precautions

## 2024-10-08 NOTE — PROGRESS NOTES
OB FOLLOW UP        Chief Complaint   Patient presents with    Routine Prenatal Visit       Subjective:   Having some trouble sleeping    Objective:  /62   Wt 62.6 kg (138 lb)   LMP 2024 (Exact Date)   BMI 22.27 kg/m²   Uterine Size: size equals dates  FHT: 110-160 BPM    See OB flow for LE edema, cvx exam if performed, and Upro/Uglu    Assessment and Plan:  30w5d  Reassuring pregnancy progress.  Questions answered.  Diagnoses and all orders for this visit:    1. Supervision of other normal pregnancy, antepartum (Primary)  Overview:  CELESTINO finalized: 24 per LMP, 7-week US and ACOG    CF/SMA negative, NIPS negative XX.  Declined AFP    COVID: Recommended   Flu: Recommended   Tdap: Recommended s/p Rx   RSV:    28-32 weeks repeat TPA: Neg  ? Desires Sterilization: Declined     Anatomy US: [facility] 24 consistent w dates, posterior placenta, vtx, wnl and completed  FU US:    PROBLEM LIST/PLAN:   Depression/anxiety-no medications.  Stable  Anemia in preg- Iron QOD, continue  +GBS urine culture - 5/15/24 treated,  pt remained symptomatic and retreated w keflex    DAMIAN NEG.  Plan to treat at delivery  Rubella non-immune - plan vaccine postpartum  N/V - taking B6, doxylamine, phenergan.  Heartburn  - Tums not helping, Rx famotidine- continue    Assessment & Plan:  Doing well, having some trouble sleeping  Discussed can try doxylamine  Is taking her iron supplement  Declined flu vaccine today, wants to receive on a Friday  Fetal kick counts   labor precautions    Orders:  -     POC Urinalysis Dipstick      Counseling:    OB precautions, leaking, VB, ernie mccormick vs PTL/Labor  FKC  Continue PNV.  Importance of healthy eating and exercise.    Return in about 2 weeks (around 10/22/2024) for Dr. Lewis, OB follow up.        Juliocesar Norwood, APRN  10/08/2024    [facility]  [facility]  [address] [place] [address]  Dept: [phone]  Dept Fax:  [phone]  Loc: [phone]

## 2024-10-21 NOTE — PROGRESS NOTES
OB FOLLOW UP      Chief Complaint   Patient presents with    Routine Prenatal Visit     Subjective:   No complaints.  Eating well, no N/V.  Had boots on last OV.     Objective:  /66   Wt 60.3 kg (133 lb)   LMP 03/07/2024 (Exact Date)   BMI 21.47 kg/m²  3.629 kg (8 lb) Body mass index is 21.47 kg/m².    See OB flow sheet for fundal height (not performed if US day of OV), FHT, edema, cvx exam if performed, and Upro/Uglu.   Chaperone present during pelvic exam if performed.      Assessment and Plan:  32w5d     Diagnoses and all orders for this visit:    1. Supervision of other normal pregnancy, antepartum (Primary)  Overview:  CELESTINO finalized: 12/12/24 per LMP, 7-week US and ACOG    CF/SMA negative, NIPS negative XX.  Declined AFP    COVID: Recommended   Flu: Vaccinated 10/22/2024   Tdap: Recommended s/p Rx   RSV:    28-32 weeks repeat TPA: Neg  ? Desires Sterilization: Declined     Anatomy US: [facility] 8/12/24 consistent w dates, posterior placenta, vtx, wnl and completed    PROBLEM LIST/PLAN:   Depression/anxiety-no medications.  Stable  Anemia in preg- Iron QOD, continue  +GBS urine culture - 5/15/24 treated,  pt remained symptomatic and retreated w keflex    DAMIAN NEG.  Plan to treat at delivery  Rubella non-immune - plan vaccine postpartum  N/V - taking B6, doxylamine, phenergan.  Heartburn  - Famotidine- continue    Orders:  -     POC Urinalysis Dipstick      Counseling:    OB precautions, leaking, VB, ernie mccormick vs PTL/Labor  FKC    Reassuring pregnancy progress. Questions answered.  Continue PNV.  Importance of healthy eating, obtaining sufficient sleep, and staying active unless hypertensive- activity modified as directed.    Return in about 2 weeks (around 11/5/2024) for FU OB q2wks to 36weeks, then weekly to CELESTINO.            Luci Lewis, DO  10/22/2024    [facility]  [address] [place] [address]  Dept: [phone]  Dept Fax: [phone]  Loc:  [phone]  Loc Fax: [phone]

## 2024-10-22 ENCOUNTER — ROUTINE PRENATAL (OUTPATIENT)
Dept: OBSTETRICS AND GYNECOLOGY | Facility: CLINIC | Age: 34
End: 2024-10-22
Payer: COMMERCIAL

## 2024-10-22 VITALS — WEIGHT: 133 LBS | DIASTOLIC BLOOD PRESSURE: 66 MMHG | BODY MASS INDEX: 21.47 KG/M2 | SYSTOLIC BLOOD PRESSURE: 102 MMHG

## 2024-10-22 DIAGNOSIS — Z23 NEED FOR INFLUENZA VACCINATION: ICD-10-CM

## 2024-10-22 DIAGNOSIS — Z34.80 SUPERVISION OF OTHER NORMAL PREGNANCY, ANTEPARTUM: Primary | ICD-10-CM

## 2024-10-22 LAB
GLUCOSE UR STRIP-MCNC: NEGATIVE MG/DL
PROT UR STRIP-MCNC: NEGATIVE MG/DL

## 2024-11-01 NOTE — PROGRESS NOTES
OB FOLLOW UP      Chief Complaint   Patient presents with    Routine Prenatal Visit     Subjective:   No complaints    Objective:  /70   Wt 62.6 kg (138 lb)   LMP 03/07/2024 (Exact Date)   BMI 22.27 kg/m²  5.897 kg (13 lb) Body mass index is 22.27 kg/m².    See OB flow sheet for fundal height (not performed if US day of OV), FHT, edema, cvx exam if performed, and Upro/Uglu.   Chaperone present during pelvic exam if performed.      Assessment and Plan:  34w5d     Diagnoses and all orders for this visit:    1. Supervision of other normal pregnancy, antepartum  Overview:  CELESTINO finalized: 12/12/24 per LMP, 7-week US and ACOG    CF/SMA negative, NIPS negative XX.  Declined AFP    COVID: Recommended   Flu: Vaccinated Oct 2024  Tdap: Recommended s/p Rx   RSV: Vaccinated 11/5/2024     28-32 weeks repeat TPA: Neg  ? Desires Sterilization: Declined     Anatomy US: [facility] 8/12/24 consistent w dates, posterior placenta, vtx, wnl and completed    PROBLEM LIST/PLAN:   Depression/anxiety-no medications.  Stable  Anemia in preg- Iron QOD, continue  +GBS urine culture - 5/15/24 treated,  pt remained symptomatic and retreated w keflex    DAMIAN NEG.  Plan to treat at delivery  Rubella non-immune - plan vaccine postpartum  N/V - taking B6, doxylamine, phenergan. Stable  Heartburn  - Famotidine- continue    Orders:  -     POC Urinalysis Dipstick    2. Need for RSV immunization  -     ABRYSVO RSV Vaccine (Adults 60+, pregnant women 32-36 wks)      Counseling:    OB precautions, leaking, VB, ernie mccormick vs PTL/Labor  FKC    Reassuring pregnancy progress. Questions answered.  Continue PNV.  Importance of healthy eating, obtaining sufficient sleep, and staying active unless hypertensive- activity modified as directed.    Return in about 2 weeks (around 11/19/2024) for FU OB then weekly to CEELSTINO.            Luci Lewis, DO  11/05/2024    [facility]  [address] [place]  [address]  Dept: [phone]  Dept Fax: [phone]  Loc: [phone]  Loc Fax: [phone]

## 2024-11-05 ENCOUNTER — ROUTINE PRENATAL (OUTPATIENT)
Dept: OBSTETRICS AND GYNECOLOGY | Facility: CLINIC | Age: 34
End: 2024-11-05
Payer: COMMERCIAL

## 2024-11-05 VITALS — SYSTOLIC BLOOD PRESSURE: 105 MMHG | BODY MASS INDEX: 22.27 KG/M2 | WEIGHT: 138 LBS | DIASTOLIC BLOOD PRESSURE: 70 MMHG

## 2024-11-05 DIAGNOSIS — Z34.80 SUPERVISION OF OTHER NORMAL PREGNANCY, ANTEPARTUM: ICD-10-CM

## 2024-11-05 DIAGNOSIS — Z29.11 NEED FOR RSV IMMUNIZATION: ICD-10-CM

## 2024-11-05 LAB
GLUCOSE UR STRIP-MCNC: NEGATIVE MG/DL
PROT UR STRIP-MCNC: NEGATIVE MG/DL

## 2024-11-19 NOTE — PROGRESS NOTES
OB FOLLOW UP      Chief Complaint   Patient presents with    Routine Prenatal Visit     Subjective:   No complaints    Objective:  /72   Wt 63.5 kg (140 lb)   LMP 03/07/2024 (Exact Date)   BMI 22.60 kg/m²  6.804 kg (15 lb) Body mass index is 22.6 kg/m².    See OB flow sheet for fundal height (not performed if US day of OV), FHT, edema, cvx exam if performed, and Upro/Uglu.   Chaperone present during pelvic exam if performed.      Assessment and Plan:  36w6d     Diagnoses and all orders for this visit:    1. Supervision of other normal pregnancy, antepartum (Primary)  Overview:  CELESTINO finalized: 12/12/24 per LMP, 7-week US and ACOG    CF/SMA negative, NIPS negative XX.  Declined AFP    COVID: Recommended, declines  Flu: Vaccinated Oct 2024  Tdap: Vaccinated  RSV: Vaccinated Nov 2024     28-32 weeks repeat TPA: Neg  ? Desires Sterilization: Declined     Anatomy US: [facility] 8/12/24 consistent w dates, posterior placenta, vtx, wnl and completed    PROBLEM LIST/PLAN:   Depression/anxiety-no medications.  Stable  Anemia in preg- Iron QOD, continue  +GBS urine culture - 5/15/24 treated,  pt remained symptomatic and retreated w keflex    DAMIAN NEG.  Plan to treat at delivery  Rubella non-immune - plan vaccine postpartum  N/V - taking B6, doxylamine, phenergan. Stable  Heartburn  - Famotidine- continue    Orders:  -     POC Urinalysis Dipstick    2. Group B Streptococcus urinary tract infection affecting pregnancy, antepartum  Overview:  5/15/24 UC positive, DAMIAN neg.  Tx at delivery      3. Antepartum anemia  Overview:  9/11/2024 iron QOD Rx, continue        Counseling:    OB precautions, leaking, VB, ernie mccormick vs PTL/Labor  FKC  IOL scheduled    Reassuring pregnancy progress. Questions answered.  Continue PNV.  Importance of healthy eating, obtaining sufficient sleep, and staying active unless hypertensive- activity modified as directed.    Return for FU OB q1wk to CELESTINO/Delivery.            Luci Lewis,  DO  11/20/2024    [facility]  [address] [place] [address]  Dept: [phone]  Dept Fax: [phone]  Loc: [phone]  Loc Fax: [phone]

## 2024-11-20 ENCOUNTER — ROUTINE PRENATAL (OUTPATIENT)
Dept: OBSTETRICS AND GYNECOLOGY | Facility: CLINIC | Age: 34
End: 2024-11-20
Payer: COMMERCIAL

## 2024-11-20 VITALS — DIASTOLIC BLOOD PRESSURE: 72 MMHG | SYSTOLIC BLOOD PRESSURE: 103 MMHG | BODY MASS INDEX: 22.6 KG/M2 | WEIGHT: 140 LBS

## 2024-11-20 DIAGNOSIS — B95.1 GROUP B STREPTOCOCCUS URINARY TRACT INFECTION AFFECTING PREGNANCY, ANTEPARTUM: ICD-10-CM

## 2024-11-20 DIAGNOSIS — O23.40 GROUP B STREPTOCOCCUS URINARY TRACT INFECTION AFFECTING PREGNANCY, ANTEPARTUM: ICD-10-CM

## 2024-11-20 DIAGNOSIS — O99.019 ANTEPARTUM ANEMIA: ICD-10-CM

## 2024-11-20 DIAGNOSIS — Z34.80 SUPERVISION OF OTHER NORMAL PREGNANCY, ANTEPARTUM: Primary | ICD-10-CM

## 2024-11-20 LAB
GLUCOSE UR STRIP-MCNC: NEGATIVE MG/DL
PROT UR STRIP-MCNC: NEGATIVE MG/DL

## 2024-11-22 NOTE — PROGRESS NOTES
OB FOLLOW UP      Chief Complaint   Patient presents with    Routine Prenatal Visit     Subjective:   No complaints    Objective:  /75   Wt 64 kg (141 lb)   LMP 03/07/2024 (Exact Date)   BMI 22.76 kg/m²  7.258 kg (16 lb) Body mass index is 22.76 kg/m².    See OB flow sheet for fundal height (not performed if US day of OV), FHT, edema, cvx exam if performed, and Upro/Uglu.   Chaperone present during pelvic exam if performed.      Assessment and Plan:  37w5d     Diagnoses and all orders for this visit:    1. Supervision of other normal pregnancy, antepartum (Primary)  Overview:  CELESTINO finalized: 12/12/24 per LMP, 7-week US and ACOG    CF/SMA negative, NIPS negative XX.  Declined AFP    COVID: Recommended, declines  Flu: Vaccinated Oct 2024  Tdap: Vaccinated  RSV: Vaccinated Nov 2024     28-32 weeks repeat TPA: Neg  ? Desires Sterilization: Declined     Anatomy US: [facility] 8/12/24 consistent w dates, posterior placenta, vtx, wnl and completed    PROBLEM LIST/PLAN:   Depression/anxiety-no medications.  Stable  Anemia in preg- Iron QOD, continue  +GBS urine culture - 5/15/24 treated,  pt remained symptomatic and retreated w keflex    DAMIAN NEG.  Plan to treat at delivery  Rubella non-immune - plan vaccine postpartum  N/V - taking B6, doxylamine, phenergan. Stable  Heartburn  - Famotidine- continue    Orders:  -     POC Urinalysis Dipstick      Counseling:    OB precautions, leaking, VB, ernie mccormick vs PTL/Labor  FKC    Reassuring pregnancy progress. Questions answered.  Continue PNV.  Importance of healthy eating, obtaining sufficient sleep, and staying active unless hypertensive- activity modified as directed.    Return in about 1 week (around 12/3/2024) for FU OB q1wk to CELESTINO/Delivery.            Luci Lewis,   11/26/2024    [facility] [place] [facility]  [address] [place] [address]  Dept: [phone]  Dept Fax: [phone]  Loc: [phone]  Loc Fax: [phone]

## 2024-11-26 ENCOUNTER — ROUTINE PRENATAL (OUTPATIENT)
Dept: OBSTETRICS AND GYNECOLOGY | Facility: CLINIC | Age: 34
End: 2024-11-26
Payer: COMMERCIAL

## 2024-11-26 VITALS — SYSTOLIC BLOOD PRESSURE: 107 MMHG | WEIGHT: 141 LBS | DIASTOLIC BLOOD PRESSURE: 75 MMHG | BODY MASS INDEX: 22.76 KG/M2

## 2024-11-26 DIAGNOSIS — Z34.80 SUPERVISION OF OTHER NORMAL PREGNANCY, ANTEPARTUM: Primary | ICD-10-CM

## 2024-11-26 DIAGNOSIS — K21.9 GASTROESOPHAGEAL REFLUX DISEASE WITHOUT ESOPHAGITIS: ICD-10-CM

## 2024-11-26 LAB
GLUCOSE UR STRIP-MCNC: NEGATIVE MG/DL
PROT UR STRIP-MCNC: NEGATIVE MG/DL

## 2024-11-26 PROCEDURE — 0502F SUBSEQUENT PRENATAL CARE: CPT | Performed by: OBSTETRICS & GYNECOLOGY

## 2024-11-26 RX ORDER — PANTOPRAZOLE SODIUM 20 MG/1
20 TABLET, DELAYED RELEASE ORAL DAILY PRN
Qty: 30 TABLET | Refills: 0 | Status: ON HOLD | OUTPATIENT
Start: 2024-11-26

## 2024-11-26 NOTE — TELEPHONE ENCOUNTER
The pt is requesting refills of Protonix.  She is 37 weeks, 5 days today.  Her next appointment is 12/03/2024 and the Rx was last sent to the pharmacy on 09/10/2024 #30 with one additional refill.  Please advise.  Thanks.

## 2024-11-30 ENCOUNTER — ANESTHESIA EVENT (OUTPATIENT)
Dept: LABOR AND DELIVERY | Facility: HOSPITAL | Age: 34
End: 2024-11-30
Payer: COMMERCIAL

## 2024-11-30 ENCOUNTER — ANESTHESIA (OUTPATIENT)
Dept: LABOR AND DELIVERY | Facility: HOSPITAL | Age: 34
End: 2024-11-30
Payer: COMMERCIAL

## 2024-11-30 ENCOUNTER — HOSPITAL ENCOUNTER (INPATIENT)
Facility: HOSPITAL | Age: 34
LOS: 3 days | Discharge: HOME | End: 2024-12-03
Attending: OBSTETRICS & GYNECOLOGY | Admitting: OBSTETRICS & GYNECOLOGY
Payer: COMMERCIAL

## 2024-11-30 PROBLEM — Z37.9 NORMAL LABOR: Status: ACTIVE | Noted: 2024-11-30

## 2024-11-30 PROBLEM — O26.899 HEARTBURN DURING PREGNANCY, ANTEPARTUM: Status: RESOLVED | Noted: 2024-07-10 | Resolved: 2024-11-30

## 2024-11-30 PROBLEM — R12 HEARTBURN DURING PREGNANCY, ANTEPARTUM: Status: RESOLVED | Noted: 2024-07-10 | Resolved: 2024-11-30

## 2024-11-30 PROBLEM — Z34.80 SUPERVISION OF OTHER NORMAL PREGNANCY, ANTEPARTUM: Status: RESOLVED | Noted: 2024-05-13 | Resolved: 2024-11-30

## 2024-11-30 PROBLEM — O09.523 MULTIGRAVIDA OF ADVANCED MATERNAL AGE IN THIRD TRIMESTER: Status: ACTIVE | Noted: 2024-11-30

## 2024-11-30 LAB
ABO GROUP BLD: NORMAL
ALBUMIN SERPL-MCNC: 3.7 G/DL (ref 3.5–5.2)
ALBUMIN/GLOB SERPL: 1.3 G/DL
ALP SERPL-CCNC: 153 U/L (ref 39–117)
ALT SERPL W P-5'-P-CCNC: 11 U/L (ref 1–33)
AMPHET+METHAMPHET UR QL: NEGATIVE
AMPHETAMINES UR QL: NEGATIVE
ANION GAP SERPL CALCULATED.3IONS-SCNC: 12.2 MMOL/L (ref 5–15)
AST SERPL-CCNC: 15 U/L (ref 1–32)
ATMOSPHERIC PRESS: 745.6 MMHG
ATMOSPHERIC PRESS: 746.2 MMHG
BARBITURATES UR QL SCN: NEGATIVE
BASE EXCESS BLDCOA CALC-SCNC: -3.5 MMOL/L (ref -2–2)
BASE EXCESS BLDCOV CALC-SCNC: -3.3 MMOL/L (ref -30–30)
BENZODIAZ UR QL SCN: NEGATIVE
BILIRUB SERPL-MCNC: 0.3 MG/DL (ref 0–1.2)
BLD GP AB SCN SERPL QL: NEGATIVE
BUN SERPL-MCNC: 10 MG/DL (ref 6–20)
BUN/CREAT SERPL: 15.2 (ref 7–25)
BUPRENORPHINE SERPL-MCNC: NEGATIVE NG/ML
CALCIUM SPEC-SCNC: 9 MG/DL (ref 8.6–10.5)
CANNABINOIDS SERPL QL: NEGATIVE
CHLORIDE SERPL-SCNC: 103 MMOL/L (ref 98–107)
CO2 SERPL-SCNC: 19.8 MMOL/L (ref 22–29)
COCAINE UR QL: NEGATIVE
CREAT SERPL-MCNC: 0.66 MG/DL (ref 0.57–1)
CREAT UR-MCNC: 310.5 MG/DL
DEPRECATED RDW RBC AUTO: 48.9 FL (ref 37–54)
EGFRCR SERPLBLD CKD-EPI 2021: 117.5 ML/MIN/1.73
ERYTHROCYTE [DISTWIDTH] IN BLOOD BY AUTOMATED COUNT: 15.6 % (ref 12.3–15.4)
FENTANYL UR-MCNC: NEGATIVE NG/ML
GLOBULIN UR ELPH-MCNC: 2.9 GM/DL
GLUCOSE SERPL-MCNC: 101 MG/DL (ref 65–99)
HCO3 BLDCOA-SCNC: 23.1 MMOL/L
HCO3 BLDCOV-SCNC: 23 MMOL/L
HCT VFR BLD AUTO: 32.7 % (ref 34–46.6)
HGB BLD-MCNC: 10.6 G/DL (ref 12–15.9)
MCH RBC QN AUTO: 28.3 PG (ref 26.6–33)
MCHC RBC AUTO-ENTMCNC: 32.4 G/DL (ref 31.5–35.7)
MCV RBC AUTO: 87.2 FL (ref 79–97)
METHADONE UR QL SCN: NEGATIVE
OPIATES UR QL: NEGATIVE
OXYCODONE UR QL SCN: NEGATIVE
PCO2 BLDCOA: 45.8 MMHG (ref 33–49)
PCO2 BLDCOV: 44.6 MM HG (ref 35–51.3)
PCP UR QL SCN: NEGATIVE
PH BLDCOA: 7.31 PH UNITS (ref 7.18–7.34)
PH BLDCOV: 7.32 PH UNITS (ref 7.26–7.4)
PLATELET # BLD AUTO: 144 10*3/MM3 (ref 140–450)
PMV BLD AUTO: 12.1 FL (ref 6–12)
PO2 BLDCOA: 11.4 MMHG
PO2 BLDCOV: <18.1 MM HG (ref 19–39)
POTASSIUM SERPL-SCNC: 3.7 MMOL/L (ref 3.5–5.2)
PROT ?TM UR-MCNC: 48.5 MG/DL
PROT SERPL-MCNC: 6.6 G/DL (ref 6–8.5)
PROT/CREAT UR: 0.16 MG/G{CREAT}
RBC # BLD AUTO: 3.75 10*6/MM3 (ref 3.77–5.28)
RH BLD: POSITIVE
SAO2 % BLDCOA: 10.2 %
SAO2 % BLDCOV: 11 %
SODIUM SERPL-SCNC: 135 MMOL/L (ref 136–145)
T&S EXPIRATION DATE: NORMAL
TREPONEMA PALLIDUM IGG+IGM AB [PRESENCE] IN SERUM OR PLASMA BY IMMUNOASSAY: NORMAL
TRICYCLICS UR QL SCN: NEGATIVE
WBC NRBC COR # BLD AUTO: 13.66 10*3/MM3 (ref 3.4–10.8)

## 2024-11-30 PROCEDURE — 85027 COMPLETE CBC AUTOMATED: CPT | Performed by: OBSTETRICS & GYNECOLOGY

## 2024-11-30 PROCEDURE — 25010000002 CEFAZOLIN PER 500 MG: Performed by: OBSTETRICS & GYNECOLOGY

## 2024-11-30 PROCEDURE — 59510 CESAREAN DELIVERY: CPT | Performed by: OBSTETRICS & GYNECOLOGY

## 2024-11-30 PROCEDURE — 25010000002 PHENYLEPHRINE 10 MG/ML SOLUTION: Performed by: NURSE ANESTHETIST, CERTIFIED REGISTERED

## 2024-11-30 PROCEDURE — 80307 DRUG TEST PRSMV CHEM ANLYZR: CPT | Performed by: OBSTETRICS & GYNECOLOGY

## 2024-11-30 PROCEDURE — 25010000002 OXYTOCIN PER 10 UNITS: Performed by: NURSE ANESTHETIST, CERTIFIED REGISTERED

## 2024-11-30 PROCEDURE — 25010000002 PROPOFOL 10 MG/ML EMULSION: Performed by: NURSE ANESTHETIST, CERTIFIED REGISTERED

## 2024-11-30 PROCEDURE — 25810000003 LACTATED RINGERS PER 1000 ML: Performed by: OBSTETRICS & GYNECOLOGY

## 2024-11-30 PROCEDURE — 82803 BLOOD GASES ANY COMBINATION: CPT

## 2024-11-30 PROCEDURE — 80053 COMPREHEN METABOLIC PANEL: CPT | Performed by: OBSTETRICS & GYNECOLOGY

## 2024-11-30 PROCEDURE — 25010000002 ROPIVACAINE PER 1 MG: Performed by: NURSE ANESTHETIST, CERTIFIED REGISTERED

## 2024-11-30 PROCEDURE — 25010000002 MORPHINE PER 10 MG: Performed by: NURSE ANESTHETIST, CERTIFIED REGISTERED

## 2024-11-30 PROCEDURE — 86780 TREPONEMA PALLIDUM: CPT | Performed by: OBSTETRICS & GYNECOLOGY

## 2024-11-30 PROCEDURE — C1755 CATHETER, INTRASPINAL: HCPCS | Performed by: NURSE ANESTHETIST, CERTIFIED REGISTERED

## 2024-11-30 PROCEDURE — 25810000003 SODIUM CHLORIDE 0.9 % SOLUTION 250 ML FLEX CONT: Performed by: OBSTETRICS & GYNECOLOGY

## 2024-11-30 PROCEDURE — 82570 ASSAY OF URINE CREATININE: CPT | Performed by: OBSTETRICS & GYNECOLOGY

## 2024-11-30 PROCEDURE — 25010000002 AZITHROMYCIN PER 500 MG: Performed by: OBSTETRICS & GYNECOLOGY

## 2024-11-30 PROCEDURE — 86850 RBC ANTIBODY SCREEN: CPT | Performed by: OBSTETRICS & GYNECOLOGY

## 2024-11-30 PROCEDURE — 84156 ASSAY OF PROTEIN URINE: CPT | Performed by: OBSTETRICS & GYNECOLOGY

## 2024-11-30 PROCEDURE — 86900 BLOOD TYPING SEROLOGIC ABO: CPT | Performed by: OBSTETRICS & GYNECOLOGY

## 2024-11-30 PROCEDURE — 86901 BLOOD TYPING SEROLOGIC RH(D): CPT | Performed by: OBSTETRICS & GYNECOLOGY

## 2024-11-30 DEVICE — SEAL HEMO SURG ARISTA/AH ABS/PWDR 3GM: Type: IMPLANTABLE DEVICE | Site: UTERUS | Status: FUNCTIONAL

## 2024-11-30 RX ORDER — ACETAMINOPHEN 325 MG/1
650 TABLET ORAL EVERY 4 HOURS PRN
Status: DISCONTINUED | OUTPATIENT
Start: 2024-11-30 | End: 2024-12-01 | Stop reason: HOSPADM

## 2024-11-30 RX ORDER — EPHEDRINE SULFATE 50 MG/ML
5 INJECTION, SOLUTION INTRAVENOUS
Status: DISCONTINUED | OUTPATIENT
Start: 2024-11-30 | End: 2024-12-01 | Stop reason: HOSPADM

## 2024-11-30 RX ORDER — DEXMEDETOMIDINE HYDROCHLORIDE 100 UG/ML
INJECTION, SOLUTION INTRAVENOUS AS NEEDED
Status: DISCONTINUED | OUTPATIENT
Start: 2024-11-30 | End: 2024-11-30 | Stop reason: SURG

## 2024-11-30 RX ORDER — FAMOTIDINE 10 MG/ML
20 INJECTION, SOLUTION INTRAVENOUS
Status: DISCONTINUED | OUTPATIENT
Start: 2024-12-01 | End: 2024-12-01 | Stop reason: HOSPADM

## 2024-11-30 RX ORDER — OXYTOCIN/0.9 % SODIUM CHLORIDE 30/500 ML
2 PLASTIC BAG, INJECTION (ML) INTRAVENOUS
Status: DISCONTINUED | OUTPATIENT
Start: 2024-11-30 | End: 2024-12-01

## 2024-11-30 RX ORDER — METHYLERGONOVINE MALEATE 0.2 MG/ML
200 INJECTION INTRAVENOUS ONCE AS NEEDED
Status: DISCONTINUED | OUTPATIENT
Start: 2024-11-30 | End: 2024-12-01 | Stop reason: HOSPADM

## 2024-11-30 RX ORDER — MISOPROSTOL 200 UG/1
800 TABLET ORAL AS NEEDED
Status: DISCONTINUED | OUTPATIENT
Start: 2024-11-30 | End: 2024-12-01 | Stop reason: HOSPADM

## 2024-11-30 RX ORDER — ONDANSETRON 2 MG/ML
4 INJECTION INTRAMUSCULAR; INTRAVENOUS EVERY 6 HOURS PRN
Status: DISCONTINUED | OUTPATIENT
Start: 2024-11-30 | End: 2024-12-01 | Stop reason: HOSPADM

## 2024-11-30 RX ORDER — CITRIC ACID/SODIUM CITRATE 334-500MG
30 SOLUTION, ORAL ORAL ONCE AS NEEDED
Status: DISCONTINUED | OUTPATIENT
Start: 2024-11-30 | End: 2024-12-01 | Stop reason: HOSPADM

## 2024-11-30 RX ORDER — PROMETHAZINE HYDROCHLORIDE 25 MG/1
25 TABLET ORAL EVERY 6 HOURS PRN
Status: DISCONTINUED | OUTPATIENT
Start: 2024-11-30 | End: 2024-12-01 | Stop reason: HOSPADM

## 2024-11-30 RX ORDER — METOCLOPRAMIDE HYDROCHLORIDE 5 MG/ML
10 INJECTION INTRAMUSCULAR; INTRAVENOUS
Status: DISCONTINUED | OUTPATIENT
Start: 2024-12-01 | End: 2024-12-01 | Stop reason: HOSPADM

## 2024-11-30 RX ORDER — MISOPROSTOL 200 UG/1
200 TABLET ORAL
Status: DISCONTINUED | OUTPATIENT
Start: 2024-11-30 | End: 2024-12-01

## 2024-11-30 RX ORDER — FAMOTIDINE 20 MG/1
20 TABLET, FILM COATED ORAL 2 TIMES DAILY PRN
Status: DISCONTINUED | OUTPATIENT
Start: 2024-11-30 | End: 2024-12-01 | Stop reason: HOSPADM

## 2024-11-30 RX ORDER — ROPIVACAINE HYDROCHLORIDE 2 MG/ML
INJECTION, SOLUTION EPIDURAL; INFILTRATION; PERINEURAL
Status: COMPLETED
Start: 2024-11-30 | End: 2024-11-30

## 2024-11-30 RX ORDER — PROPOFOL 10 MG/ML
VIAL (ML) INTRAVENOUS AS NEEDED
Status: DISCONTINUED | OUTPATIENT
Start: 2024-11-30 | End: 2024-11-30 | Stop reason: SURG

## 2024-11-30 RX ORDER — FAMOTIDINE 10 MG/ML
20 INJECTION, SOLUTION INTRAVENOUS 2 TIMES DAILY PRN
Status: DISCONTINUED | OUTPATIENT
Start: 2024-11-30 | End: 2024-12-01 | Stop reason: HOSPADM

## 2024-11-30 RX ORDER — MAGNESIUM CARB/ALUMINUM HYDROX 105-160MG
30 TABLET,CHEWABLE ORAL ONCE
Status: DISCONTINUED | OUTPATIENT
Start: 2024-11-30 | End: 2024-12-01 | Stop reason: HOSPADM

## 2024-11-30 RX ORDER — SODIUM CHLORIDE, SODIUM LACTATE, POTASSIUM CHLORIDE, CALCIUM CHLORIDE 600; 310; 30; 20 MG/100ML; MG/100ML; MG/100ML; MG/100ML
75 INJECTION, SOLUTION INTRAVENOUS CONTINUOUS
Status: DISCONTINUED | OUTPATIENT
Start: 2024-11-30 | End: 2024-12-01

## 2024-11-30 RX ORDER — ONDANSETRON 4 MG/1
4 TABLET, ORALLY DISINTEGRATING ORAL EVERY 6 HOURS PRN
Status: DISCONTINUED | OUTPATIENT
Start: 2024-11-30 | End: 2024-12-01 | Stop reason: HOSPADM

## 2024-11-30 RX ORDER — PHENYLEPHRINE HYDROCHLORIDE 10 MG/ML
INJECTION INTRAVENOUS AS NEEDED
Status: DISCONTINUED | OUTPATIENT
Start: 2024-11-30 | End: 2024-11-30 | Stop reason: SURG

## 2024-11-30 RX ORDER — LIDOCAINE HYDROCHLORIDE AND EPINEPHRINE 15; 5 MG/ML; UG/ML
INJECTION, SOLUTION EPIDURAL
Status: COMPLETED | OUTPATIENT
Start: 2024-11-30 | End: 2024-11-30

## 2024-11-30 RX ORDER — MORPHINE SULFATE 5 MG/ML
5 INJECTION, SOLUTION INTRAMUSCULAR; INTRAVENOUS
Status: DISCONTINUED | OUTPATIENT
Start: 2024-11-30 | End: 2024-12-01 | Stop reason: HOSPADM

## 2024-11-30 RX ORDER — CITRIC ACID/SODIUM CITRATE 334-500MG
30 SOLUTION, ORAL ORAL
Status: DISCONTINUED | OUTPATIENT
Start: 2024-12-01 | End: 2024-12-01 | Stop reason: HOSPADM

## 2024-11-30 RX ORDER — ROPIVACAINE HYDROCHLORIDE 2 MG/ML
INJECTION, SOLUTION EPIDURAL; INFILTRATION; PERINEURAL
Status: COMPLETED | OUTPATIENT
Start: 2024-11-30 | End: 2024-11-30

## 2024-11-30 RX ORDER — MORPHINE SULFATE 0.5 MG/ML
INJECTION, SOLUTION EPIDURAL; INTRATHECAL; INTRAVENOUS AS NEEDED
Status: DISCONTINUED | OUTPATIENT
Start: 2024-11-30 | End: 2024-11-30 | Stop reason: SURG

## 2024-11-30 RX ORDER — TERBUTALINE SULFATE 1 MG/ML
0.25 INJECTION, SOLUTION SUBCUTANEOUS AS NEEDED
Status: DISCONTINUED | OUTPATIENT
Start: 2024-11-30 | End: 2024-12-01 | Stop reason: HOSPADM

## 2024-11-30 RX ORDER — CARBOPROST TROMETHAMINE 250 UG/ML
250 INJECTION, SOLUTION INTRAMUSCULAR ONCE AS NEEDED
Status: DISCONTINUED | OUTPATIENT
Start: 2024-11-30 | End: 2024-12-01 | Stop reason: HOSPADM

## 2024-11-30 RX ORDER — OXYTOCIN 10 [USP'U]/ML
INJECTION, SOLUTION INTRAMUSCULAR; INTRAVENOUS AS NEEDED
Status: DISCONTINUED | OUTPATIENT
Start: 2024-11-30 | End: 2024-11-30 | Stop reason: SURG

## 2024-11-30 RX ADMIN — PHENYLEPHRINE HYDROCHLORIDE 200 MCG: 10 INJECTION INTRAVENOUS at 22:03

## 2024-11-30 RX ADMIN — PHENYLEPHRINE HYDROCHLORIDE 100 MCG: 10 INJECTION INTRAVENOUS at 22:16

## 2024-11-30 RX ADMIN — OXYTOCIN 40 UNITS: 10 INJECTION, SOLUTION INTRAMUSCULAR; INTRAVENOUS at 21:50

## 2024-11-30 RX ADMIN — Medication 2 MILLI-UNITS/MIN: at 17:19

## 2024-11-30 RX ADMIN — CEFAZOLIN 1000 MG: 1 INJECTION, POWDER, FOR SOLUTION INTRAMUSCULAR; INTRAVENOUS at 21:04

## 2024-11-30 RX ADMIN — DEXMEDETOMIDINE HYDROCHLORIDE 20 MCG: 100 INJECTION, SOLUTION, CONCENTRATE INTRAVENOUS at 22:04

## 2024-11-30 RX ADMIN — SODIUM CHLORIDE, SODIUM LACTATE, POTASSIUM CHLORIDE, AND CALCIUM CHLORIDE 75 ML/HR: .6; .31; .03; .02 INJECTION, SOLUTION INTRAVENOUS at 12:36

## 2024-11-30 RX ADMIN — AZITHROMYCIN 500 MG: 500 INJECTION, POWDER, LYOPHILIZED, FOR SOLUTION INTRAVENOUS at 21:32

## 2024-11-30 RX ADMIN — SODIUM CHLORIDE, POTASSIUM CHLORIDE, SODIUM LACTATE AND CALCIUM CHLORIDE 75 ML/HR: 600; 310; 30; 20 INJECTION, SOLUTION INTRAVENOUS at 20:43

## 2024-11-30 RX ADMIN — FAMOTIDINE 20 MG: 10 INJECTION INTRAVENOUS at 21:04

## 2024-11-30 RX ADMIN — LIDOCAINE HYDROCHLORIDE AND EPINEPHRINE 10 ML: 15; 5 INJECTION, SOLUTION EPIDURAL at 21:32

## 2024-11-30 RX ADMIN — PHENYLEPHRINE HYDROCHLORIDE 100 MCG: 10 INJECTION INTRAVENOUS at 22:24

## 2024-11-30 RX ADMIN — Medication 10 ML/HR: at 20:42

## 2024-11-30 RX ADMIN — PHENYLEPHRINE HYDROCHLORIDE 200 MCG: 10 INJECTION INTRAVENOUS at 21:46

## 2024-11-30 RX ADMIN — MORPHINE SULFATE 2 MG: 0.5 INJECTION, SOLUTION EPIDURAL; INTRATHECAL; INTRAVENOUS at 22:02

## 2024-11-30 RX ADMIN — DEXMEDETOMIDINE HYDROCHLORIDE 20 MCG: 100 INJECTION, SOLUTION, CONCENTRATE INTRAVENOUS at 21:54

## 2024-11-30 RX ADMIN — PROPOFOL 20 MG: 10 INJECTION, EMULSION INTRAVENOUS at 21:56

## 2024-11-30 RX ADMIN — PROPOFOL 50 MG: 10 INJECTION, EMULSION INTRAVENOUS at 21:54

## 2024-11-30 RX ADMIN — LIDOCAINE HYDROCHLORIDE AND EPINEPHRINE 2 ML: 15; 5 INJECTION, SOLUTION EPIDURAL at 12:27

## 2024-11-30 RX ADMIN — DEXMEDETOMIDINE HYDROCHLORIDE 20 MCG: 100 INJECTION, SOLUTION, CONCENTRATE INTRAVENOUS at 22:00

## 2024-11-30 RX ADMIN — SODIUM CHLORIDE 2000 MG: 9 INJECTION, SOLUTION INTRAVENOUS at 12:58

## 2024-11-30 RX ADMIN — Medication 10 ML/HR: at 12:33

## 2024-11-30 RX ADMIN — ROPIVACAINE HYDROCHLORIDE 6 ML: 2 INJECTION, SOLUTION EPIDURAL; INFILTRATION; PERINEURAL at 12:30

## 2024-11-30 RX ADMIN — DEXMEDETOMIDINE HYDROCHLORIDE 10 MCG: 100 INJECTION, SOLUTION, CONCENTRATE INTRAVENOUS at 21:58

## 2024-11-30 NOTE — PLAN OF CARE
Problem: Adult Inpatient Plan of Care  Goal: Plan of Care Review  Flowsheets (Taken 11/30/2024 1843)  Progress: improving  Outcome Evaluation: pt admitted for spontaneous labor, arom clear fluid, pitocin augmentation started, cervical change made labor is progressing. fetus reassuring. maternal VSS. pain controlled  Plan of Care Reviewed With:   patient   spouse  Goal: Readiness for Transition of Care  Intervention: Mutually Develop Transition Plan  Recent Flowsheet Documentation  Taken 11/30/2024 1241 by Siobhan Quintana, RN  Equipment Currently Used at Home: none  Taken 11/30/2024 1240 by Siobhan Quintana, RN  Transportation Anticipated:   car, drives self   family or friend will provide  Patient/Family Anticipated Services at Transition: none  Patient/Family Anticipates Transition to: home   Goal Outcome Evaluation:  Plan of Care Reviewed With: patient, spouse        Progress: improving  Outcome Evaluation: pt admitted for spontaneous labor, arom clear fluid, pitocin augmentation started, cervical change made labor is progressing. fetus reassuring. maternal VSS. pain controlled

## 2024-11-30 NOTE — ANESTHESIA PREPROCEDURE EVALUATION
Anesthesia Evaluation     Patient summary reviewed and Nursing notes reviewed   no history of anesthetic complications:   NPO Solid Status: > 8 hours  NPO Liquid Status: > 2 hours           Airway   Mallampati: II  TM distance: >3 FB  Neck ROM: full  No difficulty expected  Dental - normal exam     Pulmonary - negative pulmonary ROS and normal exam    breath sounds clear to auscultation  Cardiovascular - negative cardio ROS and normal exam  Exercise tolerance: good (4-7 METS)    Rhythm: regular  Rate: normal        Neuro/Psych- negative ROS  GI/Hepatic/Renal/Endo    (+) obesity, GERD well controlled    Musculoskeletal (-) negative ROS    Abdominal   (+) obese   Substance History - negative use     OB/GYN    (+) Pregnant        Other - negative ROS       ROS/Med Hx Other:                Anesthesia Plan    ASA 2     epidural       Anesthetic plan, risks, benefits, and alternatives have been provided, discussed and informed consent has been obtained with: patient.    Plan discussed with CRNA.    CODE STATUS:    Code Status (Patient has no pulse and is not breathing): CPR (Attempt to Resuscitate)  Medical Interventions (Patient has pulse or is breathing): Full Support

## 2024-11-30 NOTE — PROGRESS NOTES
"[facility]  Obstetric Intrapartum Progress Note    Patient Name: Sera Hoff  : 1990  MRN: [id number]    Date of Admission: 2024    Subjective     Subjective:  Comfortable with epidural    Objective     Vitals:  Temp:  [98.4 °F (36.9 °C)-99.1 °F (37.3 °C)] 99.1 °F (37.3 °C)  Heart Rate:  [61-77] 64  BP: (108-130)/(70-89) 118/70     Flowsheet Rows      Flowsheet Row First Filed Value   Admission Height 167.6 cm (66\") Documented at 2024 1237   Admission Weight 64 kg (141 lb) Documented at 2024 1237          Physical Exam:  General appearance: alert and in no distress  Cervix: Dilation: 4-5 cm              Effacement: 100%              Station: -1              Presentation: cephalic   AROM with clear fluid.  IUPC placed without difficulty    EFM:  Baseline:  125  Variability:   Moderate  Accelerations: Present (32 weeks+) 15 x 15 bpm  Decelerations: Absent  Contractions:  q5min    Intake/Output last 24 hours:  No intake or output data in the 24 hours ending 24 1641    Intake/Output this shift:  No intake/output data recorded.    Labs     Lab Results (last 24 hours)       Procedure Component Value Units Date/Time    CBC (No Diff) [[id number]]  (Abnormal) Collected: 24 111    Specimen: Blood Updated: 24 1134     WBC 13.66 10*3/mm3      RBC 3.75 10*6/mm3      Hemoglobin 10.6 g/dL      Hematocrit 32.7 %      MCV 87.2 fL      MCH 28.3 pg      MCHC 32.4 g/dL      RDW 15.6 %      RDW-SD 48.9 fl      MPV 12.1 fL      Platelets 144 10*3/mm3     Comprehensive Metabolic Panel [[id number]]  (Abnormal) Collected: 24 111    Specimen: Blood Updated: 24 1151     Glucose 101 mg/dL      BUN 10 mg/dL      Creatinine 0.66 mg/dL      Sodium 135 mmol/L      Potassium 3.7 mmol/L      Chloride 103 mmol/L      CO2 19.8 mmol/L      Calcium 9.0 mg/dL      Total Protein 6.6 g/dL      Albumin 3.7 g/dL      ALT (SGPT) 11 U/L      AST (SGOT) 15 U/L      Alkaline Phosphatase " 153 U/L      Total Bilirubin 0.3 mg/dL      Globulin 2.9 gm/dL      A/G Ratio 1.3 g/dL      BUN/Creatinine Ratio 15.2     Anion Gap 12.2 mmol/L      eGFR 117.5 mL/min/1.73     Narrative:      GFR Normal >60  Chronic Kidney Disease <60  Kidney Failure <15      Protein / Creatinine Ratio, Urine - Urine, Clean Catch [[id number]] Collected: 11/30/24 1115    Specimen: Urine, Clean Catch Updated: 11/30/24 1203     Creatinine, Urine 310.5 mg/dL      Total Protein, Urine 48.5 mg/dL      Protein/Creatinine Ratio, Urine 0.16    Urine Drug Screen - Urine, Clean Catch [[id number]]  (Normal) Collected: 11/30/24 1115    Specimen: Urine, Clean Catch Updated: 11/30/24 1239     THC, Screen, Urine Negative     Phencyclidine (PCP), Urine Negative     Cocaine Screen, Urine Negative     Methamphetamine, Ur Negative     Opiate Screen Negative     Amphetamine Screen, Urine Negative     Benzodiazepine Screen, Urine Negative     Tricyclic Antidepressants Screen Negative     Methadone Screen, Urine Negative     Barbiturates Screen, Urine Negative     Oxycodone Screen, Urine Negative     Buprenorphine, Screen, Urine Negative    Narrative:      Cutoff For Drugs Screened:    Amphetamines               500 ng/ml  Barbiturates               200 ng/ml  Benzodiazepines            150 ng/ml  Cocaine                    150 ng/ml  Methadone                  200 ng/ml  Opiates                    100 ng/ml  Phencyclidine               25 ng/ml  THC                         50 ng/ml  Methamphetamine            500 ng/ml  Tricyclic Antidepressants  300 ng/ml  Oxycodone                  100 ng/ml  Buprenorphine               10 ng/ml    The normal value for all drugs tested is negative. This report includes unconfirmed screening results, with the cutoff values listed, to be used for medical treatment purposes only.  Unconfirmed results must not be used for non-medical purposes such as employment or legal testing.  Clinical consideration should be applied to  any drug of abuse test, particularly when unconfirmed results are used.      Fentanyl, Urine - Urine, Clean Catch [[id number]]  (Normal) Collected: 11/30/24 1115    Specimen: Urine, Clean Catch Updated: 11/30/24 1218     Fentanyl, Urine Negative    Narrative:      Negative Threshold:      Fentanyl 5 ng/mL     The normal value for the drug tested is negative. This report includes final unconfirmed screening results to be used for medical treatment purposes only. Unconfirmed results must not be used for non-medical purposes such as employment or legal testing. Clinical consideration should be applied to any drug of abuse test, particularly when unconfirmed results are used.           Treponema pallidum AB w/Reflex RPR [[id number]]  (Normal) Collected: 11/30/24 1213    Specimen: Blood Updated: 11/30/24 1249     Treponemal AB Total Non-Reactive    Narrative:      Reactive results will reflex RPR testing.             Assessment / Plan     Assessment:    Normal labor    Rubella non-immune status, antepartum    Group B Streptococcus urinary tract infection affecting pregnancy, antepartum    Asthma due to environmental allergies    Anemia of mother in pregnancy    Multigravida of advanced maternal age in third trimester    Category I  Reassuring fetus    Plan:  Start pitocin  Continue to monitor  Active labor positioning  Reviewed course/progress/plan with pt, questions answered to her satisfaction, she desires to proceed as outlined.    Electronically signed by Robert Silva MD, 11/30/24, 4:41 PM EST.

## 2024-11-30 NOTE — ANESTHESIA PROCEDURE NOTES
Labor Epidural      Patient reassessed immediately prior to procedure    Patient location during procedure: OB  Start Time: 11/30/2024 12:27 PM  Stop Time: 11/30/2024 12:37 PM  Indication:at surgeon's request  Performed By  CRNA/AUDREY: Vin Dupont CRNA  Preanesthetic Checklist  Completed: patient identified, IV checked, site marked, risks and benefits discussed, surgical consent, monitors and equipment checked, pre-op evaluation and timeout performed  Additional Notes  Pt spine with MARCIN  Prep:  Pt Position:sitting  Sterile Tech:cap, gloves, mask and sterile barrier  Prep:povidone-iodine 7.5% surgical scrub  Monitoring:blood pressure monitoring and continuous pulse oximetry  Epidural Block Procedure:  Approach:midline  Guidance:landmark technique and palpation technique  Location:L3-L4  Needle Type:Tuohy  Needle Gauge:17 G  Loss of Resistance Medium: air  Loss of Resistance: 4cm  Cath Depth at skin:8 cm  Paresthesia: none  Aspiration:negative  Test Dose:negative  Medication: lidocaine 1.5%-EPINEPHrine 1:200,000 (XYLOCAINE W/EPI) injection - Epidural   2 mL - 11/30/2024 12:27:00 PM  ropivacaine (NAROPIN) 0.2 % injection - Injection   6 mL - 11/30/2024 12:30:00 PM  Number of Attempts: 1  Post Assessment:  Dressing:biopatch applied, occlusive dressing applied and secured with tape  Pt Tolerance:patient tolerated the procedure well with no apparent complications  Complications:no

## 2024-11-30 NOTE — H&P
DIETER [facility]din  Obstetric History and Physical    Chief Complaint   Patient presents with    Contractions     Since 0400       Subjective     HPI:    Patient is a 34 y.o. female  currently at 38w2d, who presents to OB ED with/for worsening contractions.    She has been seeing [facility] for her prenatal care.  The pregnancy has been complicated by the following problems:    Patient Active Problem List   Diagnosis    Supervision of other normal pregnancy, antepartum    Rubella non-immune status, antepartum    Group B Streptococcus urinary tract infection affecting pregnancy, antepartum    Anxiety    Depression    Asthma due to environmental allergies    Heartburn during pregnancy, antepartum    Anemia of mother in pregnancy    Advamced maternal age    Normal labor       The following portions of the patients history were reviewed and updated as appropriate:   current medications, allergies, past medical history, past surgical history, past family history, past social history and current problem list.     Prenatal Information:  Prenatal Results       Initial Prenatal Labs       Test Value Reference Range Date Time    Hemoglobin  12.6 g/dL 12.0 - 15.9 24 0959       12.6 g/dL 12.0 - 15.9 24 1014    Hematocrit  37.8 % 34.0 - 46.6 24 0959       38.1 % 34.0 - 46.6 24 1014    Platelets  189 10*3/mm3 140 - 450 24 0959       213 10*3/mm3 140 - 450 24 1014    Rubella IgG  <0.90 index Immune >0.99 24 0959    Hepatitis B SAg  Non-Reactive  Non-Reactive 24 0959    Hepatitis C Ab  Non-Reactive  Non-Reactive 24 0959       Non-Reactive  Non-Reactive 24 1014    T. Pallidum Ab   Non-Reactive  Non-Reactive 09/10/24 1453       Non-Reactive  Non-Reactive 24 0959    ABO  O   24 0959    Rh  Positive   24 0959    Antibody Screen  Negative   24 0959    HIV  Non-Reactive  Non-Reactive 24 0959    Urine Culture  No growth   24 1513       >100,000 CFU/mL  Streptococcus agalactiae (Group B)   24 0959    Gonorrhea  Negative  Negative 24 0959    Chlamydia  Negative  Negative 24 0959    TSH  1.610 uIU/mL 0.270 - 4.200 24 1014    Hemoglobinopathy Fractionation  Comment   24 0959    Fragile X                    2nd and 3rd Trimester       Test Value Reference Range Date Time    Hemoglobin (repeated)  10.6 g/dL 12.0 - 15.9 24 1115       10.6 g/dL 12.0 - 15.9 09/10/24 1453    Hematocrit (repeated)  32.7 % 34.0 - 46.6 24 1115       31.5 % 34.0 - 46.6 09/10/24 1453    Platelets   144 10*3/mm3 140 - 450 24 1115       205 10*3/mm3 140 - 450 09/10/24 1453       189 10*3/mm3 140 - 450 24 0959       213 10*3/mm3 140 - 450 24 1014    1 hour GTT   103 mg/dL 65 - 139 09/10/24 1453    3rd TM syphilis scrn (repeated) TP-Ab  Non-Reactive  Non-Reactive 09/10/24 1453    3rd TM syphilis screen TB-Ab (FTA)  Non-Reactive  Non-Reactive 09/10/24 1453                Drug Screening       Test Value Reference Range Date Time    Barbiturate Screen  Negative  Negative 24 0959    Benzodiazepine Screen  Negative  Negative 24 0959    Methadone Screen  Negative  Negative 24 0959    Opiates Screen  Negative  Negative 24 0959    THC Screen  Negative  Negative 24 0959    Cocaine Screen  Negative  Negative 24 0959    Oxycodone Screen  Negative  Negative 24 0959                  Past OB History:     OB History    Para Term  AB Living   2 0 0 0 1 0   SAB IAB Ectopic Molar Multiple Live Births   1 0 0 0 0 0      # Outcome Date GA Lbr Zaid/2nd Weight Sex Type Anes PTL Lv   2 Current            1 SAB 2020 6w0d    SAB          Past Medical History: Past Medical History:   Diagnosis Date    Anxiety     Depression     Endometriosis     Stage IV, bowel, Pasic adv L/S surgeon x2    Ovarian cyst       Past Surgical History Past Surgical History:   Procedure Laterality Date    APPENDECTOMY  2019    Pasic,  endometriosis in appy    FULGURATION ENDOMETRIOSIS  2019    Dr. Ospina, adv l/s surgeon, extensive endometriosis    FULGURATION ENDOMETRIOSIS  2024    Dr. Ospina, adv l/s surgeon, extensive endometriosis    WISDOM TOOTH EXTRACTION        Family History: Family History   Problem Relation Age of Onset    Prostate cancer Father     Hypertension Father     Hypertension Mother     Prostate cancer Paternal Grandfather     Diabetes Paternal Grandmother     Hypertension Paternal Grandmother     Stroke Maternal Grandmother     Hypertension Maternal Grandmother     Stroke Maternal Grandfather     Diabetes Maternal Grandfather     Hypertension Maternal Grandfather     Stroke Other         uncle    Diabetes Other         uncle      Social History:  reports that she has never smoked. She has never been exposed to tobacco smoke. She has never used smokeless tobacco.   reports that she does not currently use alcohol.   reports no history of drug use.        General ROS: Pertinent items are noted in HPI  Home Medications:  Prenat MV-Min w/Fe-Folate-DHA, doxylamine, ferrous sulfate, and pantoprazole    Allergies:  Allergies   Allergen Reactions    Penicillins Rash    Sulfa Antibiotics Rash    Zofran [Ondansetron] Nausea And Vomiting       Objective       Vital Signs Range for the last 24 hours  Temperature:     Temp Source:     BP: BP: (117)/(85) 117/85   Pulse: Heart Rate:  [76] 76   Respirations:     SPO2: SpO2:  [99 %] 99 %     Physical Examination:   General appearance - alert, well appearing, and in no distress  Mental status - alert, oriented to person, place, and time  Chest - normal effort  Heart - normal rate  Abdomen - soft, nondistended; no rebound or guarding  Pelvic - changed from 3/80-4/90  Neurological - alert, oriented, normal speech  Extremities - Edema none  Skin - normal coloration and turgor, no suspicious skin lesions noted    Presentation: cephalic   Cervix: Method: sterile vaginal exam performed   Dilation:  Cervical Dilation (cm): 4   Effacement: Cervical Effacement: 90   Station:  -1       Fetal Heart Rate Assessment :      Beats/min: Fetal HR (beats/min): 130   Baseline: Fetal HR Baseline: normal range   Variability: Fetal HR Variability: moderate (amplitude range 6 to 25 bpm)   Accels: Fetal HR Accelerations: greater than/equal to 15 bpm, lasting at least 15 seconds   Decels: Fetal HR Decelerations: absent     Uterine Assessment   Method: Method: external tocotransducer, palpation, per patient report   Frequency (min): Contraction Frequency (Minutes): 2-4   Intensity: Contraction Intensity: moderate by palpation     Assessment & Plan     Assessment:  -  Intrauterine pregnancy at 38w2d gestation who presents for: contractions  - known GBS bacteruria in pregnancy    Plan:  - Admit, IV, labs, epidural prn. Start ancef for GBS. Dr. Silva on call for [facility] and assumes management of patient.   - Plan of care has been reviewed with patient and patient agrees.   - Risks, benefits of treatment plan have been discussed.  - All questions have been answered.        Electronically signed by Mireille Lunsford MD, 11/30/24, 11:57 AM EST.

## 2024-12-01 PROBLEM — O36.8390 NON-REASSURING FETAL HEART RATE OR RHYTHM AFFECTING MANAGEMENT OF MOTHER: Status: ACTIVE | Noted: 2024-12-01

## 2024-12-01 LAB
ANION GAP SERPL CALCULATED.3IONS-SCNC: 9.2 MMOL/L (ref 5–15)
BASOPHILS # BLD AUTO: 0.06 10*3/MM3 (ref 0–0.2)
BASOPHILS # BLD AUTO: 0.07 10*3/MM3 (ref 0–0.2)
BASOPHILS NFR BLD AUTO: 0.3 % (ref 0–1.5)
BASOPHILS NFR BLD AUTO: 0.3 % (ref 0–1.5)
BUN SERPL-MCNC: 10 MG/DL (ref 6–20)
BUN/CREAT SERPL: 14.3 (ref 7–25)
CALCIUM SPEC-SCNC: 8.2 MG/DL (ref 8.6–10.5)
CHLORIDE SERPL-SCNC: 103 MMOL/L (ref 98–107)
CO2 SERPL-SCNC: 21.8 MMOL/L (ref 22–29)
CREAT SERPL-MCNC: 0.7 MG/DL (ref 0.57–1)
DEPRECATED RDW RBC AUTO: 49.5 FL (ref 37–54)
DEPRECATED RDW RBC AUTO: 49.6 FL (ref 37–54)
EGFRCR SERPLBLD CKD-EPI 2021: 115.8 ML/MIN/1.73
EOSINOPHIL # BLD AUTO: 0 10*3/MM3 (ref 0–0.4)
EOSINOPHIL # BLD AUTO: 0.05 10*3/MM3 (ref 0–0.4)
EOSINOPHIL NFR BLD AUTO: 0 % (ref 0.3–6.2)
EOSINOPHIL NFR BLD AUTO: 0.2 % (ref 0.3–6.2)
ERYTHROCYTE [DISTWIDTH] IN BLOOD BY AUTOMATED COUNT: 15.5 % (ref 12.3–15.4)
ERYTHROCYTE [DISTWIDTH] IN BLOOD BY AUTOMATED COUNT: 15.7 % (ref 12.3–15.4)
GLUCOSE SERPL-MCNC: 96 MG/DL (ref 65–99)
HCT VFR BLD AUTO: 26.7 % (ref 34–46.6)
HCT VFR BLD AUTO: 29.1 % (ref 34–46.6)
HGB BLD-MCNC: 8.4 G/DL (ref 12–15.9)
HGB BLD-MCNC: 9.1 G/DL (ref 12–15.9)
IMM GRANULOCYTES # BLD AUTO: 0.15 10*3/MM3 (ref 0–0.05)
IMM GRANULOCYTES # BLD AUTO: 0.19 10*3/MM3 (ref 0–0.05)
IMM GRANULOCYTES NFR BLD AUTO: 0.7 % (ref 0–0.5)
IMM GRANULOCYTES NFR BLD AUTO: 0.9 % (ref 0–0.5)
LYMPHOCYTES # BLD AUTO: 0.83 10*3/MM3 (ref 0.7–3.1)
LYMPHOCYTES # BLD AUTO: 0.88 10*3/MM3 (ref 0.7–3.1)
LYMPHOCYTES NFR BLD AUTO: 4.1 % (ref 19.6–45.3)
LYMPHOCYTES NFR BLD AUTO: 4.1 % (ref 19.6–45.3)
MCH RBC QN AUTO: 27.5 PG (ref 26.6–33)
MCH RBC QN AUTO: 27.7 PG (ref 26.6–33)
MCHC RBC AUTO-ENTMCNC: 31.3 G/DL (ref 31.5–35.7)
MCHC RBC AUTO-ENTMCNC: 31.5 G/DL (ref 31.5–35.7)
MCV RBC AUTO: 87.5 FL (ref 79–97)
MCV RBC AUTO: 88.7 FL (ref 79–97)
MONOCYTES # BLD AUTO: 1.04 10*3/MM3 (ref 0.1–0.9)
MONOCYTES # BLD AUTO: 1.13 10*3/MM3 (ref 0.1–0.9)
MONOCYTES NFR BLD AUTO: 5.1 % (ref 5–12)
MONOCYTES NFR BLD AUTO: 5.2 % (ref 5–12)
NEUTROPHILS NFR BLD AUTO: 18.3 10*3/MM3 (ref 1.7–7)
NEUTROPHILS NFR BLD AUTO: 19.27 10*3/MM3 (ref 1.7–7)
NEUTROPHILS NFR BLD AUTO: 89.5 % (ref 42.7–76)
NEUTROPHILS NFR BLD AUTO: 89.6 % (ref 42.7–76)
NRBC BLD AUTO-RTO: 0 /100 WBC (ref 0–0.2)
NRBC BLD AUTO-RTO: 0 /100 WBC (ref 0–0.2)
PLATELET # BLD AUTO: 143 10*3/MM3 (ref 140–450)
PLATELET # BLD AUTO: 145 10*3/MM3 (ref 140–450)
PMV BLD AUTO: 11.8 FL (ref 6–12)
PMV BLD AUTO: 12.3 FL (ref 6–12)
POTASSIUM SERPL-SCNC: 3.7 MMOL/L (ref 3.5–5.2)
RBC # BLD AUTO: 3.05 10*6/MM3 (ref 3.77–5.28)
RBC # BLD AUTO: 3.28 10*6/MM3 (ref 3.77–5.28)
SODIUM SERPL-SCNC: 134 MMOL/L (ref 136–145)
WBC NRBC COR # BLD AUTO: 20.43 10*3/MM3 (ref 3.4–10.8)
WBC NRBC COR # BLD AUTO: 21.54 10*3/MM3 (ref 3.4–10.8)

## 2024-12-01 PROCEDURE — 59025 FETAL NON-STRESS TEST: CPT

## 2024-12-01 PROCEDURE — 85025 COMPLETE CBC W/AUTO DIFF WBC: CPT | Performed by: OBSTETRICS & GYNECOLOGY

## 2024-12-01 PROCEDURE — 25010000002 KETOROLAC TROMETHAMINE PER 15 MG: Performed by: OBSTETRICS & GYNECOLOGY

## 2024-12-01 PROCEDURE — 25010000002 CLINDAMYCIN 900 MG/50ML SOLUTION: Performed by: OBSTETRICS & GYNECOLOGY

## 2024-12-01 PROCEDURE — 51702 INSERT TEMP BLADDER CATH: CPT

## 2024-12-01 PROCEDURE — 80048 BASIC METABOLIC PNL TOTAL CA: CPT | Performed by: OBSTETRICS & GYNECOLOGY

## 2024-12-01 PROCEDURE — 25010000002 GENTAMICIN PER 80 MG: Performed by: OBSTETRICS & GYNECOLOGY

## 2024-12-01 PROCEDURE — 25010000002 CEFAZOLIN PER 500 MG: Performed by: OBSTETRICS & GYNECOLOGY

## 2024-12-01 PROCEDURE — 87040 BLOOD CULTURE FOR BACTERIA: CPT | Performed by: OBSTETRICS & GYNECOLOGY

## 2024-12-01 RX ORDER — HYDROMORPHONE HYDROCHLORIDE 1 MG/ML
0.5 INJECTION, SOLUTION INTRAMUSCULAR; INTRAVENOUS; SUBCUTANEOUS
Status: DISCONTINUED | OUTPATIENT
Start: 2024-12-01 | End: 2024-12-02

## 2024-12-01 RX ORDER — OXYTOCIN/0.9 % SODIUM CHLORIDE 30/500 ML
999 PLASTIC BAG, INJECTION (ML) INTRAVENOUS ONCE
Status: DISCONTINUED | OUTPATIENT
Start: 2024-12-01 | End: 2024-12-03 | Stop reason: HOSPADM

## 2024-12-01 RX ORDER — ACETAMINOPHEN 500 MG
1000 TABLET ORAL EVERY 6 HOURS
Status: COMPLETED | OUTPATIENT
Start: 2024-12-01 | End: 2024-12-01

## 2024-12-01 RX ORDER — IBUPROFEN 600 MG/1
600 TABLET, FILM COATED ORAL EVERY 6 HOURS
Status: DISCONTINUED | OUTPATIENT
Start: 2024-12-02 | End: 2024-12-03 | Stop reason: HOSPADM

## 2024-12-01 RX ORDER — OXYTOCIN/0.9 % SODIUM CHLORIDE 30/500 ML
250 PLASTIC BAG, INJECTION (ML) INTRAVENOUS CONTINUOUS
Status: ACTIVE | OUTPATIENT
Start: 2024-12-01 | End: 2024-12-01

## 2024-12-01 RX ORDER — OXYCODONE HYDROCHLORIDE 5 MG/1
5 TABLET ORAL EVERY 4 HOURS PRN
Status: DISCONTINUED | OUTPATIENT
Start: 2024-12-01 | End: 2024-12-03 | Stop reason: HOSPADM

## 2024-12-01 RX ORDER — BISACODYL 10 MG
10 SUPPOSITORY, RECTAL RECTAL DAILY PRN
Status: DISCONTINUED | OUTPATIENT
Start: 2024-12-01 | End: 2024-12-03 | Stop reason: HOSPADM

## 2024-12-01 RX ORDER — FERROUS SULFATE 325(65) MG
325 TABLET ORAL EVERY OTHER DAY
Status: DISCONTINUED | OUTPATIENT
Start: 2024-12-01 | End: 2024-12-03 | Stop reason: HOSPADM

## 2024-12-01 RX ORDER — OXYCODONE HYDROCHLORIDE 5 MG/1
10 TABLET ORAL EVERY 4 HOURS PRN
Status: DISCONTINUED | OUTPATIENT
Start: 2024-12-01 | End: 2024-12-03 | Stop reason: HOSPADM

## 2024-12-01 RX ORDER — HYDROMORPHONE HYDROCHLORIDE 2 MG/ML
0.25 INJECTION, SOLUTION INTRAMUSCULAR; INTRAVENOUS; SUBCUTANEOUS
Status: DISCONTINUED | OUTPATIENT
Start: 2024-12-01 | End: 2024-12-02

## 2024-12-01 RX ORDER — ONDANSETRON 2 MG/ML
4 INJECTION INTRAMUSCULAR; INTRAVENOUS EVERY 6 HOURS PRN
Status: DISCONTINUED | OUTPATIENT
Start: 2024-12-01 | End: 2024-12-03 | Stop reason: HOSPADM

## 2024-12-01 RX ORDER — PANTOPRAZOLE SODIUM 20 MG/1
20 TABLET, DELAYED RELEASE ORAL DAILY PRN
Status: DISCONTINUED | OUTPATIENT
Start: 2024-12-01 | End: 2024-12-03

## 2024-12-01 RX ORDER — NALOXONE HCL 0.4 MG/ML
0.4 VIAL (ML) INJECTION ONCE AS NEEDED
Status: ACTIVE | OUTPATIENT
Start: 2024-12-01 | End: 2024-12-02

## 2024-12-01 RX ORDER — ACETAMINOPHEN 325 MG/1
650 TABLET ORAL ONCE AS NEEDED
Status: DISCONTINUED | OUTPATIENT
Start: 2024-12-01 | End: 2024-12-03 | Stop reason: HOSPADM

## 2024-12-01 RX ORDER — CLINDAMYCIN PHOSPHATE 900 MG/50ML
900 INJECTION, SOLUTION INTRAVENOUS EVERY 8 HOURS
Status: DISCONTINUED | OUTPATIENT
Start: 2024-12-01 | End: 2024-12-02

## 2024-12-01 RX ORDER — ALUMINA, MAGNESIA, AND SIMETHICONE 2400; 2400; 240 MG/30ML; MG/30ML; MG/30ML
15 SUSPENSION ORAL EVERY 4 HOURS PRN
Status: DISCONTINUED | OUTPATIENT
Start: 2024-12-01 | End: 2024-12-03 | Stop reason: HOSPADM

## 2024-12-01 RX ORDER — IBUPROFEN 800 MG/1
800 TABLET, FILM COATED ORAL ONCE AS NEEDED
Status: DISCONTINUED | OUTPATIENT
Start: 2024-12-01 | End: 2024-12-03 | Stop reason: HOSPADM

## 2024-12-01 RX ORDER — KETOROLAC TROMETHAMINE 30 MG/ML
15 INJECTION, SOLUTION INTRAMUSCULAR; INTRAVENOUS EVERY 6 HOURS
Status: DISPENSED | OUTPATIENT
Start: 2024-12-01 | End: 2024-12-02

## 2024-12-01 RX ORDER — CLINDAMYCIN PHOSPHATE 150 MG/ML
900 INJECTION, SOLUTION INTRAVENOUS EVERY 8 HOURS SCHEDULED
Status: DISCONTINUED | OUTPATIENT
Start: 2024-12-01 | End: 2024-12-01

## 2024-12-01 RX ORDER — DOCUSATE SODIUM 100 MG/1
100 CAPSULE, LIQUID FILLED ORAL 2 TIMES DAILY
Status: DISCONTINUED | OUTPATIENT
Start: 2024-12-01 | End: 2024-12-03 | Stop reason: HOSPADM

## 2024-12-01 RX ORDER — OXYTOCIN/0.9 % SODIUM CHLORIDE 30/500 ML
125 PLASTIC BAG, INJECTION (ML) INTRAVENOUS ONCE AS NEEDED
Status: DISCONTINUED | OUTPATIENT
Start: 2024-12-01 | End: 2024-12-03 | Stop reason: HOSPADM

## 2024-12-01 RX ORDER — MEPERIDINE HYDROCHLORIDE 25 MG/ML
12.5 INJECTION INTRAMUSCULAR; INTRAVENOUS; SUBCUTANEOUS
Status: DISCONTINUED | OUTPATIENT
Start: 2024-12-01 | End: 2024-12-02

## 2024-12-01 RX ORDER — CALCIUM CARBONATE 500 MG/1
1 TABLET, CHEWABLE ORAL EVERY 4 HOURS PRN
Status: DISCONTINUED | OUTPATIENT
Start: 2024-12-01 | End: 2024-12-03 | Stop reason: HOSPADM

## 2024-12-01 RX ORDER — KETOROLAC TROMETHAMINE 30 MG/ML
30 INJECTION, SOLUTION INTRAMUSCULAR; INTRAVENOUS ONCE
Status: COMPLETED | OUTPATIENT
Start: 2024-12-01 | End: 2024-12-01

## 2024-12-01 RX ORDER — FAMOTIDINE 20 MG/1
20 TABLET, FILM COATED ORAL ONCE AS NEEDED
Status: DISCONTINUED | OUTPATIENT
Start: 2024-12-01 | End: 2024-12-03 | Stop reason: HOSPADM

## 2024-12-01 RX ORDER — HYDROMORPHONE HYDROCHLORIDE 1 MG/ML
0.25 INJECTION, SOLUTION INTRAMUSCULAR; INTRAVENOUS; SUBCUTANEOUS
Status: DISCONTINUED | OUTPATIENT
Start: 2024-12-01 | End: 2024-12-02

## 2024-12-01 RX ORDER — HYDROCODONE BITARTRATE AND ACETAMINOPHEN 10; 325 MG/1; MG/1
1 TABLET ORAL EVERY 4 HOURS PRN
Status: DISCONTINUED | OUTPATIENT
Start: 2024-12-01 | End: 2024-12-01

## 2024-12-01 RX ORDER — DIPHENHYDRAMINE HYDROCHLORIDE 50 MG/ML
12.5 INJECTION INTRAMUSCULAR; INTRAVENOUS EVERY 6 HOURS PRN
Status: DISCONTINUED | OUTPATIENT
Start: 2024-12-01 | End: 2024-12-02

## 2024-12-01 RX ORDER — ACETAMINOPHEN 325 MG/1
650 TABLET ORAL EVERY 6 HOURS
Status: DISCONTINUED | OUTPATIENT
Start: 2024-12-02 | End: 2024-12-02

## 2024-12-01 RX ORDER — HYDROCODONE BITARTRATE AND ACETAMINOPHEN 5; 325 MG/1; MG/1
1 TABLET ORAL EVERY 4 HOURS PRN
Status: DISCONTINUED | OUTPATIENT
Start: 2024-12-01 | End: 2024-12-01

## 2024-12-01 RX ORDER — ONDANSETRON 4 MG/1
4 TABLET, ORALLY DISINTEGRATING ORAL EVERY 6 HOURS PRN
Status: DISCONTINUED | OUTPATIENT
Start: 2024-12-01 | End: 2024-12-03 | Stop reason: HOSPADM

## 2024-12-01 RX ORDER — DIPHENHYDRAMINE HCL 25 MG
25 CAPSULE ORAL EVERY 6 HOURS PRN
Status: DISCONTINUED | OUTPATIENT
Start: 2024-12-01 | End: 2024-12-02

## 2024-12-01 RX ORDER — PROMETHAZINE HYDROCHLORIDE 25 MG/1
25 TABLET ORAL EVERY 6 HOURS PRN
Status: DISCONTINUED | OUTPATIENT
Start: 2024-12-01 | End: 2024-12-03 | Stop reason: HOSPADM

## 2024-12-01 RX ORDER — FAMOTIDINE 10 MG/ML
20 INJECTION, SOLUTION INTRAVENOUS ONCE AS NEEDED
Status: DISCONTINUED | OUTPATIENT
Start: 2024-12-01 | End: 2024-12-03 | Stop reason: HOSPADM

## 2024-12-01 RX ADMIN — MISOPROSTOL 200 MCG: 200 TABLET ORAL at 04:43

## 2024-12-01 RX ADMIN — DOCUSATE SODIUM 100 MG: 100 CAPSULE, LIQUID FILLED ORAL at 22:07

## 2024-12-01 RX ADMIN — ACETAMINOPHEN 1000 MG: 500 TABLET ORAL at 14:28

## 2024-12-01 RX ADMIN — ACETAMINOPHEN 1000 MG: 500 TABLET ORAL at 08:14

## 2024-12-01 RX ADMIN — OXYCODONE 5 MG: 5 TABLET ORAL at 23:50

## 2024-12-01 RX ADMIN — GENTAMICIN SULFATE 320 MG: 40 INJECTION, SOLUTION INTRAMUSCULAR; INTRAVENOUS at 18:40

## 2024-12-01 RX ADMIN — ACETAMINOPHEN 1000 MG: 500 TABLET ORAL at 02:00

## 2024-12-01 RX ADMIN — DOCUSATE SODIUM 100 MG: 100 CAPSULE, LIQUID FILLED ORAL at 08:14

## 2024-12-01 RX ADMIN — MISOPROSTOL 200 MCG: 200 TABLET ORAL at 12:19

## 2024-12-01 RX ADMIN — ACETAMINOPHEN 1000 MG: 500 TABLET ORAL at 20:30

## 2024-12-01 RX ADMIN — CLINDAMYCIN PHOSPHATE 900 MG: 900 INJECTION, SOLUTION INTRAVENOUS at 17:19

## 2024-12-01 RX ADMIN — MISOPROSTOL 200 MCG: 200 TABLET ORAL at 00:31

## 2024-12-01 RX ADMIN — CEFAZOLIN 2000 MG: 2 INJECTION, POWDER, FOR SOLUTION INTRAVENOUS at 18:00

## 2024-12-01 RX ADMIN — KETOROLAC TROMETHAMINE 15 MG: 30 INJECTION, SOLUTION INTRAMUSCULAR; INTRAVENOUS at 08:14

## 2024-12-01 RX ADMIN — KETOROLAC TROMETHAMINE 30 MG: 30 INJECTION, SOLUTION INTRAMUSCULAR; INTRAVENOUS at 02:00

## 2024-12-01 RX ADMIN — MISOPROSTOL 200 MCG: 200 TABLET ORAL at 08:14

## 2024-12-01 RX ADMIN — KETOROLAC TROMETHAMINE 15 MG: 30 INJECTION, SOLUTION INTRAMUSCULAR; INTRAVENOUS at 20:31

## 2024-12-01 RX ADMIN — FERROUS SULFATE TAB 325 MG (65 MG ELEMENTAL FE) 325 MG: 325 (65 FE) TAB at 08:14

## 2024-12-01 RX ADMIN — KETOROLAC TROMETHAMINE 15 MG: 30 INJECTION, SOLUTION INTRAMUSCULAR; INTRAVENOUS at 14:29

## 2024-12-01 NOTE — PLAN OF CARE
Problem: Postpartum ( Delivery)  Goal: Effective Oxygenation and Ventilation  Outcome: Progressing     Problem: Postpartum ( Delivery)  Goal: Effective Urinary Elimination  Outcome: Progressing     Problem: Postpartum ( Delivery)  Goal: Optimal Pain Control and Function  Outcome: Progressing     Problem: Postpartum ( Delivery)  Goal: Successful Parent Role Transition  Outcome: Progressing  Goal: Hemostasis  Outcome: Progressing  Intervention: Monitor Bleeding  Recent Flowsheet Documentation  Taken 2024 2100 by Ynes Gil RN  Stabilization Measures: provider notified  Intervention: Manage Bleeding  Recent Flowsheet Documentation  Taken 2024 2100 by Ynes Gil RN  Stabilization Measures: provider notified  Goal: Effective Bowel Elimination  Outcome: Progressing  Goal: Fluid and Electrolyte Balance  Outcome: Progressing  Intervention: Monitor and Manage Fluid and Electrolyte Balance  Recent Flowsheet Documentation  Taken 2024 by Ynes Gil RN  Fluid/Electrolyte Management: intravenous fluids adjusted  Goal: Absence of Infection Signs and Symptoms  Outcome: Progressing  Goal: Anesthesia/Sedation Recovery  Outcome: Progressing  Intervention: Optimize Anesthesia Recovery  Recent Flowsheet Documentation  Taken 2024 2100 by Ynes Gil RN  Stabilization Measures: provider notified  Taken 2024 by Ynes Gil RN  Safety Promotion/Fall Prevention: safety round/check completed  Goal: Optimal Pain Control and Function  Outcome: Progressing  Goal: Nausea and Vomiting Relief  Outcome: Progressing  Goal: Effective Urinary Elimination  Outcome: Progressing  Goal: Effective Oxygenation and Ventilation  Outcome: Progressing   Goal Outcome Evaluation:      Pt. Stable post  section, informed of available pain medications to help control post op pain.

## 2024-12-01 NOTE — PROGRESS NOTES
OB progress note    I was called to the patient's bedside for repetitive variable and late decelerations.  The patient has not made any cervical change in the last several hours despite adequate contractions.  The fetal heart rate tracing is category 2-3 persistently.  Even with Pitocin off and position changes she continues to have late and variable decelerations with most contractions.  Given this concerning fetal heart rate tracing, I have recommended proceeding to primary  delivery.  We have reviewed the risks, benefits, and alternatives of the procedure including the risk of: bleeding, infection, hemorrhage, blood transfusion, risk of injury to nearby structures including: bowl, bladder, pelvic blood vessels and nerves, risk of injury to the baby, risk of anesthesia, venous thromboembolism, myocardial infarction, stroke, and death. We have also discussed the risks of repetitive  deliveries including the risk of abnormal placentation such as placenta accreta. The patient expresses her understanding of these risks and wishes to proceed.    Electronically signed by Robert Silva MD, 24, 9:28 PM EST.

## 2024-12-01 NOTE — PROGRESS NOTES
[facility]   Delivery Postpartum Progress Note    Patient Name: Sera Hoff  : 1990  MRN: [id number]  Primary Care Physician:  Jerica Moody, SHELIA  Date of Admission: 2024    Subjective     Chief Complaint: Postpartum    Subjective:  No complatins, Pain controlled, Tolerating a regular diet, No nausea, Not passing flatus, Not out of bed yet, Soria catheter still in place, Lochia normal/improving, and No lightheadedness or dizziness    Objective     Vitals:   Temp:  [97.7 °F (36.5 °C)-99.2 °F (37.3 °C)] 98.8 °F (37.1 °C)  Heart Rate:  [] 65  Resp:  [16-20] 16  BP: ()/() 113/73    Physical Exam  General: alert and in no distress  Abdomen: Soft, Non-distended, Appropriately tender to palpation around the incision, Fundus firm and non-tender, and Fundal height U-2  Incision: dressed  Extremities: +1 edema    Intake/Output last 24 hours:    Intake/Output Summary (Last 24 hours) at 2024 1014  Last data filed at 2024 0500  Gross per 24 hour   Intake 700 ml   Output 2050 ml   Net -1350 ml       Intake/Output this shift:  No intake/output data recorded.    Labs     Lab Results (last 24 hours)       Procedure Component Value Units Date/Time    CBC (No Diff) [[id number]]  (Abnormal) Collected: 24 111    Specimen: Blood Updated: 24 1134     WBC 13.66 10*3/mm3      RBC 3.75 10*6/mm3      Hemoglobin 10.6 g/dL      Hematocrit 32.7 %      MCV 87.2 fL      MCH 28.3 pg      MCHC 32.4 g/dL      RDW 15.6 %      RDW-SD 48.9 fl      MPV 12.1 fL      Platelets 144 10*3/mm3     Comprehensive Metabolic Panel [[id number]]  (Abnormal) Collected: 24 1115    Specimen: Blood Updated: 24 1151     Glucose 101 mg/dL      BUN 10 mg/dL      Creatinine 0.66 mg/dL      Sodium 135 mmol/L      Potassium 3.7 mmol/L      Chloride 103 mmol/L      CO2 19.8 mmol/L      Calcium 9.0 mg/dL      Total Protein 6.6 g/dL      Albumin 3.7 g/dL      ALT (SGPT) 11 U/L       AST (SGOT) 15 U/L      Alkaline Phosphatase 153 U/L      Total Bilirubin 0.3 mg/dL      Globulin 2.9 gm/dL      A/G Ratio 1.3 g/dL      BUN/Creatinine Ratio 15.2     Anion Gap 12.2 mmol/L      eGFR 117.5 mL/min/1.73     Narrative:      GFR Normal >60  Chronic Kidney Disease <60  Kidney Failure <15      Protein / Creatinine Ratio, Urine - Urine, Clean Catch [[id number]] Collected: 11/30/24 1115    Specimen: Urine, Clean Catch Updated: 11/30/24 1203     Creatinine, Urine 310.5 mg/dL      Total Protein, Urine 48.5 mg/dL      Protein/Creatinine Ratio, Urine 0.16    Urine Drug Screen - Urine, Clean Catch [[id number]]  (Normal) Collected: 11/30/24 1115    Specimen: Urine, Clean Catch Updated: 11/30/24 1239     THC, Screen, Urine Negative     Phencyclidine (PCP), Urine Negative     Cocaine Screen, Urine Negative     Methamphetamine, Ur Negative     Opiate Screen Negative     Amphetamine Screen, Urine Negative     Benzodiazepine Screen, Urine Negative     Tricyclic Antidepressants Screen Negative     Methadone Screen, Urine Negative     Barbiturates Screen, Urine Negative     Oxycodone Screen, Urine Negative     Buprenorphine, Screen, Urine Negative    Narrative:      Cutoff For Drugs Screened:    Amphetamines               500 ng/ml  Barbiturates               200 ng/ml  Benzodiazepines            150 ng/ml  Cocaine                    150 ng/ml  Methadone                  200 ng/ml  Opiates                    100 ng/ml  Phencyclidine               25 ng/ml  THC                         50 ng/ml  Methamphetamine            500 ng/ml  Tricyclic Antidepressants  300 ng/ml  Oxycodone                  100 ng/ml  Buprenorphine               10 ng/ml    The normal value for all drugs tested is negative. This report includes unconfirmed screening results, with the cutoff values listed, to be used for medical treatment purposes only.  Unconfirmed results must not be used for non-medical purposes such as employment or legal testing.   Clinical consideration should be applied to any drug of abuse test, particularly when unconfirmed results are used.      Fentanyl, Urine - Urine, Clean Catch [[id number]]  (Normal) Collected: 11/30/24 1115    Specimen: Urine, Clean Catch Updated: 11/30/24 1218     Fentanyl, Urine Negative    Narrative:      Negative Threshold:      Fentanyl 5 ng/mL     The normal value for the drug tested is negative. This report includes final unconfirmed screening results to be used for medical treatment purposes only. Unconfirmed results must not be used for non-medical purposes such as employment or legal testing. Clinical consideration should be applied to any drug of abuse test, particularly when unconfirmed results are used.           Treponema pallidum AB w/Reflex RPR [[id number]]  (Normal) Collected: 11/30/24 1213    Specimen: Blood Updated: 11/30/24 1249     Treponemal AB Total Non-Reactive    Narrative:      Reactive results will reflex RPR testing.    Blood Gas, Venous, Cord [[id number]]  (Abnormal) Collected: 11/30/24 2205    Specimen: Cord Blood Venous from Umbilical Cord Updated: 11/30/24 2207     pH, Cord Venous 7.321 pH Units      pCO2, Cord Venous 44.6 mm Hg      pO2, Cord Venous <18.1 mm Hg      HCO3, Cord Venous 23.0 mmol/L      Base Excess, Cord Venous -3.3 mmol/L      Comment: Serial Number: [id number]:  [id number]        O2 Sat, Cord Venous 11.0 %      Barometric Pressure for Blood Gas 746.2000 mmHg     Blood Gas, Arterial, Cord [[id number]]  (Abnormal) Collected: 11/30/24 2205    Specimen: Cord Blood Arterial from Umbilical Cord Updated: 11/30/24 2208     pH, Cord Arterial 7.31 pH Units      pCO2, Cord Arterial 45.8 mmHg      pO2, Cord Arterial 11.4 mmHg      HCO3, Cord Arterial 23.1 mmol/L      Base Exc, Cord Arterial -3.5 mmol/L      Comment: Serial Number: [id number]:  [id number]        O2 Sat, Cord Arterial 10.2 %      Barometric Pressure for Blood Gas 745.6000 mmHg     CBC & Differential [[id number]]   (Abnormal) Collected: 24    Specimen: Blood Updated: 24    Narrative:      The following orders were created for panel order CBC & Differential.  Procedure                               Abnormality         Status                     ---------                               -----------         ------                     CBC Auto Differential[[id number]]        Abnormal            Final result                 Please view results for these tests on the individual orders.    CBC Auto Differential [[id number]]  (Abnormal) Collected: 24    Specimen: Blood Updated: 24     WBC 20.43 10*3/mm3      RBC 3.28 10*6/mm3      Hemoglobin 9.1 g/dL      Hematocrit 29.1 %      MCV 88.7 fL      MCH 27.7 pg      MCHC 31.3 g/dL      RDW 15.5 %      RDW-SD 49.5 fl      MPV 12.3 fL      Platelets 145 10*3/mm3      Neutrophil % 89.6 %      Lymphocyte % 4.1 %      Monocyte % 5.1 %      Eosinophil % 0.0 %      Basophil % 0.3 %      Immature Grans % 0.9 %      Neutrophils, Absolute 18.30 10*3/mm3      Lymphocytes, Absolute 0.83 10*3/mm3      Monocytes, Absolute 1.04 10*3/mm3      Eosinophils, Absolute 0.00 10*3/mm3      Basophils, Absolute 0.07 10*3/mm3      Immature Grans, Absolute 0.19 10*3/mm3      nRBC 0.0 /100 WBC              Assessment / Plan     Assessment:  Post-partum Day: 1   Status post , Low Transverse      Normal labor    Rubella non-immune status, antepartum    Group B Streptococcus urinary tract infection affecting pregnancy, antepartum    Asthma due to environmental allergies    Anemia of mother in pregnancy    Multigravida of advanced maternal age in third trimester     delivery delivered    Non-reassuring fetal heart rate or rhythm affecting management of mother    Plan:   Continue routine postoperative care.  Ambulate, Saline lock IV, PO pain medications, Shower, Remove bandage, Incision care instructions, reviewed the importance of wound care, Keep the incision  clean and dry, and Breast feeding support  Plan of care has been reviewed with patient.  All questions have been answered.    Electronically signed by Robert Silva MD, 12/01/24, 10:14 AM EST.

## 2024-12-01 NOTE — L&D DELIVERY NOTE
[facility]   Delivery Procedure Report    Patient Name:  Sera Hoff  YOB: 1990  MRN: [id number]  Date of Procedure:  2024     Pre-Operative Diagnosis:   34 y.o.  at 38w2d    Normal labor    Rubella non-immune status, antepartum    Group B Streptococcus urinary tract infection affecting pregnancy, antepartum    Asthma due to environmental allergies    Anemia of mother in pregnancy    Multigravida of advanced maternal age in third trimester    Nonreassuring fetal status    Post Operative Diagnosis:  34 y.o.  at 38w2d    Normal labor    Rubella non-immune status, antepartum    Group B Streptococcus urinary tract infection affecting pregnancy, antepartum    Asthma due to environmental allergies    Anemia of mother in pregnancy    Multigravida of advanced maternal age in third trimester    Nonreassuring fetal status     delivery delivered    Extension of the hysterotomy inferiorly down the left side of the lower      uterine segment and cervix.    Avulsion of the left broad ligament    Procedure(s):   SECTION PRIMARY    Surgeon: Surgeon(s):  Robert Silva MD    Assistant:  Mireille Lunsford MD    Anesthesia:   Epidural    Estimated Blood Loss: 1000 mL    Antibiotics:   Kefzol and Azithromycin    Specimens:          Placenta - discarded    Findings:  Delivery Date:                       2024  Delivery Time:                       9:50 PM    Infant:                                    female infant                                     3230 g (7 lb 1.9 oz)  APGAR:                                8  @ 1 minute / 9  @ 5 minutes  Cord:   present.   Nuchal Cord:  no     Placenta Delivered:    at        Cord Blood Obtained:     Cord Gases Obtained:      Cord Gas Results: Venous:    pH, Cord Venous   Date Value Ref Range Status   2024 7.321 7.260 - 7.400 pH Units Final     Base Excess, Cord Venous   Date Value Ref Range Status    11/30/2024 -3.3 -30.0 - 30.0 mmol/L Final     Comment:     Serial Number: [id number]:  [id number]       Arterial:    pH, Cord Arterial   Date Value Ref Range Status   11/30/2024 7.31 7.18 - 7.34 pH Units Final     Base Exc, Cord Arterial   Date Value Ref Range Status   11/30/2024 -3.5 (L) -2.0 - 2.0 mmol/L Final     Comment:     Serial Number: [id number]:  [id number]          Complications: None    Description of Procedure:  After reviewing the informed consent, the patient expressed her understanding and desire to proceed.  She was taken to the operating room with an IV in place and running.  She was placed on the operating table in the dorsal supine position with leftward tilt.  The patient's epidural was bolused to a surgical level.  A sterile Soria catheter was already in place.  The patient was then prepped and draped in the usual sterile fashion. After a surgical timeout was performed, and the patient's anesthesia was found to be adequate I made a Pfannenstiel skin incision with the scalpel.  The incision was carried down to the underlying fascia with the cautery. The fascia was nicked in the midline. The fascia was extended laterally, in a curvilinear fashion with Pino scissors.  I used 2 kochers to grasp the superior portion of the fascia, and this was taken off the rectus muscle sharply.  The kochers were removed and replaced on the posterior portion of the fascia. The fascia was taken off the rectus muscle sharply.  The midline was identified, tented up with 2 hemostats, and entered sharply.  The peritoneum was extended in a sharp fashion with good visualization of the bladder.  The bladder blade was placed.  The bladder flap was developed sharply.  I made a low transverse uterine incision with the scalpel.  The uterine cavity was entered bluntly, and membranes were already ruptured.  The fluid color was clear.  The fetal head was gently lifted to the hysterotomy.as I lifted the head to the incision, the  mother had an episode of emesis.  This force my hand outwards towards the vagina and bladder significantly.  The fetal head slipped from my hand.  I had to reinsert my hand into the hysterotomy and gently lift the head back to the incision. The baby was delivered with gentle fundal pressure at 9:50 PM.  After complete delivery of the infant, the mouth and nose were bulb suctioned, the cord was doubly clamped and cut, and the baby was passed off to the awaiting nurses. Apgars were 8 and 9 at 1 and 5 minutes respectively. The baby's birth weight was 7 pounds 2 ounces. Cord blood and gases were collected, and the placenta was delivered manually and intact at 9:52 PM.  The uterus was then exteriorized, and the endometrium was curetted with a dry lap.  At this point, it was clear there was a large extension of the hysterotomy down the left side of the uterus inferiorly towards the cervix and vagina.  This caused a complete avulsion of the left broad ligament almost all the way down to the cardinal ligament.  T clamps were used to grasp the anterior lower uterine segment.  The anterior lower uterine segment was then tented up so that we could see the angle of the extension.  I started at the most airfare portion of this angle of this extension that I could see.  I then closed the hysterotomy running up the left side of the uterus and then around the hysterotomy with 0 Monocryl suture in a running fashion.  This greatly improved her bleeding.  We were able to better visualized the left angle of the extension at this point.  I still had about 1 cm inferior to my anchor stitch.  I called for another 0 Monocryl suture.  This time I was able to completely get the angle of the hysterotomy extension.  I then closed the second layer over the initial imbricating this layer.  I was careful to make sure I grasped pubocervical fascia as I came around the hysterotomy to make sure I had adequate uterine strength.  At this point, there  was excellent hemostasis along the hysterotomy and the extension inferiorly.  Where the broad ligament had avulsed, there was some oozing near the junction of the utero-ovarian ligament to the uterus.  I placed 2 figure-of-eight 0 Vicryl sutures here which greatly helped control this oozing.  The decision was then made to close the broad ligament to prevent any herniation of bowel through that opening.  Better visualize the area, and Eduardo retractor was placed.  I called for a 3-0 Vicryl suture on an SH needle.  I carefully fully grasped uterine tissue beginning inferiorly and then grabbing a small portion of the broad ligament.  I was careful to avoid getting too lateral to make sure that ureter was not in danger of being incorporated into this closure.  I carefully close the hysterotomy all the way up to the junction of the round ligament, beginning with the anterior leaf of the broad ligament.  I then passed this needle through the broad ligament to the posterior portion of the uterus.  The uterus was then deflected caudad towards the patient's mons pubis allowing visualization of the posterior side of the uterus.  In similar fashion I closed the posterior leaf the broad ligament running that same 3-0 Vicryl suture beginning superiorly working inferiorly.  Once this had been completely close there was excellent hemostasis in this area.   The pelvis was copiously irrigated.  The areas were reinspected and noted to be hemostatic.  Because of how inferior the extension was I felt it best to backfill the bladder with sterile milk to ensure no bladder injury had occurred.  The nurses used a sterile Gale clamp to clamp off the patient's Soria.  The bladder was then backfilled under sterile conditions with sterile milk.  No bladder injury was noted.  My sutures were well away from the bladder.  The back clamp was removed.  For added hemostasis I did go ahead and place Lena along the left broad ligament especially  inferiorly near the apex of the extension both posteriorly and anteriorly.  No gross bleeding was noted this area, this was again for insurance of hemostasis.  The hysterotomy was reinspected and noted be hemostatic.  3 Gale clamps were used to grasp the peritoneum, and the peritoneum was closed with a 3-0 Monocryl suture in a running fashion.  The rectus muscle was reapproximated with 3 interrupted horizontal mattress sutures using a #1 chromic suture.  The fascia was then closed in a running fashion using a 0 Vicryl suture.  The subcutaneous spaces were copiously irrigated, and hemostasis was achieved with the cautery.  The subcutaneous space was then reapproximated with a 3-0 Monocryl suture in a running fashion.  The skin was closed with a 4-0 Monocryl suture in a subcuticular fashion.  A sterile towels applied over the incision.  The patient was placed in a frog-leg position, and the drapes were removed.  I expressed any remaining blood and clot from the uterus.  A sterile vaginal and cervical exam was performed.  The cervix remained patent all the way into the uterine cavity.  A sterile bandage was applied to the incision, and the patient was transferred to the recovery room in satisfactory condition.  The patient received Kefzol and azithromycin as her preoperative antibiotics.  All counts were correct x2 at the end of the procedure.  Both mother and  are recovering in excellent condition in the recovery area.       Mireille Lunsford MD was responsible for performing the following activities: Retraction, Suction, Irrigation, and Delivery of Fetus and their skilled assistance was necessary for the success of this case.    Robert Silva MD     Date: 2024  Time: 22:57 EST

## 2024-12-01 NOTE — PLAN OF CARE
Problem: Adult Inpatient Plan of Care  Goal: Absence of Hospital-Acquired Illness or Injury  Intervention: Identify and Manage Fall Risk  Intervention: Prevent and Manage VTE (Venous Thromboembolism) Risk     Problem: Postpartum ( Delivery)  Goal: Anesthesia/Sedation Recovery  Intervention: Optimize Anesthesia Recovery   Goal Outcome Evaluation:               Care plan reviewed

## 2024-12-01 NOTE — PROGRESS NOTES
24   I was first assistant for Dr. Silva on patient's primary  section/repair of cervical and broad ligament laceration.    Mireille Lunsford MD  2024  00:19 EST

## 2024-12-01 NOTE — PLAN OF CARE
Goal Outcome Evaluation:              Outcome Evaluation: VSS. Pain controlled. Soria in place with adequate output. Bonding well with infant. Support person at bedside.

## 2024-12-02 LAB
ALBUMIN SERPL-MCNC: 2.4 G/DL (ref 3.5–5.2)
ALBUMIN/GLOB SERPL: 1 G/DL
ALP SERPL-CCNC: 111 U/L (ref 39–117)
ALT SERPL W P-5'-P-CCNC: 6 U/L (ref 1–33)
ANION GAP SERPL CALCULATED.3IONS-SCNC: 8.4 MMOL/L (ref 5–15)
AST SERPL-CCNC: 16 U/L (ref 1–32)
BASOPHILS # BLD AUTO: 0.02 10*3/MM3 (ref 0–0.2)
BASOPHILS NFR BLD AUTO: 0.1 % (ref 0–1.5)
BILIRUB SERPL-MCNC: 0.2 MG/DL (ref 0–1.2)
BUN SERPL-MCNC: 8 MG/DL (ref 6–20)
BUN/CREAT SERPL: 13.6 (ref 7–25)
CALCIUM SPEC-SCNC: 8.1 MG/DL (ref 8.6–10.5)
CHLORIDE SERPL-SCNC: 106 MMOL/L (ref 98–107)
CO2 SERPL-SCNC: 22.6 MMOL/L (ref 22–29)
CREAT SERPL-MCNC: 0.59 MG/DL (ref 0.57–1)
DEPRECATED RDW RBC AUTO: 49.4 FL (ref 37–54)
EGFRCR SERPLBLD CKD-EPI 2021: 120.7 ML/MIN/1.73
EOSINOPHIL # BLD AUTO: 0.02 10*3/MM3 (ref 0–0.4)
EOSINOPHIL NFR BLD AUTO: 0.1 % (ref 0.3–6.2)
ERYTHROCYTE [DISTWIDTH] IN BLOOD BY AUTOMATED COUNT: 15.8 % (ref 12.3–15.4)
FERRITIN SERPL-MCNC: 82.54 NG/ML (ref 13–150)
GLOBULIN UR ELPH-MCNC: 2.4 GM/DL
GLUCOSE SERPL-MCNC: 130 MG/DL (ref 65–99)
HCT VFR BLD AUTO: 23.8 % (ref 34–46.6)
HGB BLD-MCNC: 7.7 G/DL (ref 12–15.9)
IMM GRANULOCYTES # BLD AUTO: 0.23 10*3/MM3 (ref 0–0.05)
IMM GRANULOCYTES NFR BLD AUTO: 1.2 % (ref 0–0.5)
IRON 24H UR-MRATE: 10 MCG/DL (ref 37–145)
IRON SATN MFR SERPL: 3 % (ref 20–50)
LYMPHOCYTES # BLD AUTO: 1.42 10*3/MM3 (ref 0.7–3.1)
LYMPHOCYTES NFR BLD AUTO: 7.6 % (ref 19.6–45.3)
MCH RBC QN AUTO: 28 PG (ref 26.6–33)
MCHC RBC AUTO-ENTMCNC: 32.4 G/DL (ref 31.5–35.7)
MCV RBC AUTO: 86.5 FL (ref 79–97)
MONOCYTES # BLD AUTO: 0.95 10*3/MM3 (ref 0.1–0.9)
MONOCYTES NFR BLD AUTO: 5.1 % (ref 5–12)
NEUTROPHILS NFR BLD AUTO: 16.11 10*3/MM3 (ref 1.7–7)
NEUTROPHILS NFR BLD AUTO: 85.9 % (ref 42.7–76)
NRBC BLD AUTO-RTO: 0 /100 WBC (ref 0–0.2)
PLATELET # BLD AUTO: 138 10*3/MM3 (ref 140–450)
PMV BLD AUTO: 11.6 FL (ref 6–12)
POTASSIUM SERPL-SCNC: 3.7 MMOL/L (ref 3.5–5.2)
PROT SERPL-MCNC: 4.8 G/DL (ref 6–8.5)
RBC # BLD AUTO: 2.75 10*6/MM3 (ref 3.77–5.28)
SODIUM SERPL-SCNC: 137 MMOL/L (ref 136–145)
TIBC SERPL-MCNC: 359 MCG/DL (ref 298–536)
TRANSFERRIN SERPL-MCNC: 241 MG/DL (ref 200–360)
WBC NRBC COR # BLD AUTO: 18.75 10*3/MM3 (ref 3.4–10.8)

## 2024-12-02 PROCEDURE — 25810000003 SODIUM CHLORIDE 0.9 % SOLUTION: Performed by: OBSTETRICS & GYNECOLOGY

## 2024-12-02 PROCEDURE — 82728 ASSAY OF FERRITIN: CPT | Performed by: OBSTETRICS & GYNECOLOGY

## 2024-12-02 PROCEDURE — 84466 ASSAY OF TRANSFERRIN: CPT | Performed by: OBSTETRICS & GYNECOLOGY

## 2024-12-02 PROCEDURE — 80053 COMPREHEN METABOLIC PANEL: CPT | Performed by: OBSTETRICS & GYNECOLOGY

## 2024-12-02 PROCEDURE — 25010000002 IRON SUCROSE PER 1 MG: Performed by: OBSTETRICS & GYNECOLOGY

## 2024-12-02 PROCEDURE — 85025 COMPLETE CBC W/AUTO DIFF WBC: CPT | Performed by: OBSTETRICS & GYNECOLOGY

## 2024-12-02 PROCEDURE — 83540 ASSAY OF IRON: CPT | Performed by: OBSTETRICS & GYNECOLOGY

## 2024-12-02 PROCEDURE — 25010000002 CLINDAMYCIN 900 MG/50ML SOLUTION: Performed by: OBSTETRICS & GYNECOLOGY

## 2024-12-02 PROCEDURE — 25010000002 CEFAZOLIN PER 500 MG: Performed by: OBSTETRICS & GYNECOLOGY

## 2024-12-02 RX ORDER — ACETAMINOPHEN 325 MG/1
650 TABLET ORAL EVERY 6 HOURS
Status: DISCONTINUED | OUTPATIENT
Start: 2024-12-02 | End: 2024-12-03 | Stop reason: HOSPADM

## 2024-12-02 RX ORDER — CLINDAMYCIN HYDROCHLORIDE 300 MG/1
300 CAPSULE ORAL EVERY 8 HOURS SCHEDULED
Status: DISCONTINUED | OUTPATIENT
Start: 2024-12-02 | End: 2024-12-03 | Stop reason: HOSPADM

## 2024-12-02 RX ORDER — CEPHALEXIN 500 MG/1
500 CAPSULE ORAL EVERY 8 HOURS SCHEDULED
Status: DISCONTINUED | OUTPATIENT
Start: 2024-12-02 | End: 2024-12-03 | Stop reason: HOSPADM

## 2024-12-02 RX ADMIN — ACETAMINOPHEN 650 MG: 325 TABLET ORAL at 14:39

## 2024-12-02 RX ADMIN — CEPHALEXIN 500 MG: 500 CAPSULE ORAL at 22:39

## 2024-12-02 RX ADMIN — ACETAMINOPHEN 650 MG: 325 TABLET ORAL at 02:44

## 2024-12-02 RX ADMIN — IRON SUCROSE 300 MG: 20 INJECTION, SOLUTION INTRAVENOUS at 11:33

## 2024-12-02 RX ADMIN — IBUPROFEN 600 MG: 600 TABLET, FILM COATED ORAL at 08:57

## 2024-12-02 RX ADMIN — DOCUSATE SODIUM 100 MG: 100 CAPSULE, LIQUID FILLED ORAL at 08:58

## 2024-12-02 RX ADMIN — CEFAZOLIN 2000 MG: 2 INJECTION, POWDER, FOR SOLUTION INTRAVENOUS at 01:30

## 2024-12-02 RX ADMIN — OXYCODONE 5 MG: 5 TABLET ORAL at 17:47

## 2024-12-02 RX ADMIN — CLINDAMYCIN PHOSPHATE 900 MG: 900 INJECTION, SOLUTION INTRAVENOUS at 10:09

## 2024-12-02 RX ADMIN — OXYCODONE 5 MG: 5 TABLET ORAL at 10:35

## 2024-12-02 RX ADMIN — CEFAZOLIN 2000 MG: 2 INJECTION, POWDER, FOR SOLUTION INTRAVENOUS at 09:02

## 2024-12-02 RX ADMIN — ACETAMINOPHEN 650 MG: 325 TABLET ORAL at 22:38

## 2024-12-02 RX ADMIN — IBUPROFEN 600 MG: 600 TABLET, FILM COATED ORAL at 02:46

## 2024-12-02 RX ADMIN — IBUPROFEN 600 MG: 600 TABLET, FILM COATED ORAL at 20:11

## 2024-12-02 RX ADMIN — CLINDAMYCIN PHOSPHATE 900 MG: 900 INJECTION, SOLUTION INTRAVENOUS at 02:30

## 2024-12-02 RX ADMIN — CLINDAMYCIN HYDROCHLORIDE 300 MG: 300 CAPSULE ORAL at 22:39

## 2024-12-02 RX ADMIN — DOCUSATE SODIUM 100 MG: 100 CAPSULE, LIQUID FILLED ORAL at 20:11

## 2024-12-02 RX ADMIN — IBUPROFEN 600 MG: 600 TABLET, FILM COATED ORAL at 16:49

## 2024-12-02 NOTE — PROGRESS NOTES
[facility]   Delivery Postpartum Progress Note    Patient Name: Sera Hoff  : 1990  MRN: [id number]  Primary Care Physician:  Jerica Moody, SHELIA  Date of Admission: 2024    Subjective     Chief Complaint: Postpartum    Subjective:  No complatins, Pain controlled, Tolerating a regular diet, No nausea, Passing flatus, Ambulating, Urinating without difficulty, Lochia normal/improving, and No lightheadedness or dizziness    Objective     Vitals:   Temp:  [98.1 °F (36.7 °C)-100.4 °F (38 °C)] 98.2 °F (36.8 °C)  Heart Rate:  [71-89] 75  Resp:  [16] 16  BP: (102-122)/(58-76) 108/58    Physical Exam  General: alert and in no distress  Abdomen: Soft, Non-distended, Appropriately tender to palpation around the incision, Fundus firm and non-tender, and Fundal height U-2  Incision: dressed  Extremities: +1 edema    Intake/Output last 24 hours:    Intake/Output Summary (Last 24 hours) at 2024 0745  Last data filed at 2024 1700  Gross per 24 hour   Intake --   Output 1025 ml   Net -1025 ml       Intake/Output this shift:  No intake/output data recorded.    Labs     Lab Results (last 24 hours)       Procedure Component Value Units Date/Time    Blood Culture - Blood, Arm, Right [[id number]] Collected: 24    Specimen: Blood from Arm, Right Updated: 24 170    Basic Metabolic Panel [[id number]]  (Abnormal) Collected: 24    Specimen: Blood from Arm, Right Updated: 24 1729     Glucose 96 mg/dL      BUN 10 mg/dL      Creatinine 0.70 mg/dL      Sodium 134 mmol/L      Potassium 3.7 mmol/L      Chloride 103 mmol/L      CO2 21.8 mmol/L      Calcium 8.2 mg/dL      BUN/Creatinine Ratio 14.3     Anion Gap 9.2 mmol/L      eGFR 115.8 mL/min/1.73     Narrative:      GFR Normal >60  Chronic Kidney Disease <60  Kidney Failure <15      CBC & Differential [[id number]]  (Abnormal) Collected: 24    Specimen: Blood from Arm, Right Updated: 24 2177     Narrative:      The following orders were created for panel order CBC & Differential.  Procedure                               Abnormality         Status                     ---------                               -----------         ------                     CBC Auto Differential[[id number]]        Abnormal            Final result               Scan Slide[[id number]]                                                                    Please view results for these tests on the individual orders.    CBC Auto Differential [[id number]]  (Abnormal) Collected: 12/01/24 1658    Specimen: Blood from Arm, Right Updated: 12/01/24 1712     WBC 21.54 10*3/mm3      RBC 3.05 10*6/mm3      Hemoglobin 8.4 g/dL      Hematocrit 26.7 %      MCV 87.5 fL      MCH 27.5 pg      MCHC 31.5 g/dL      RDW 15.7 %      RDW-SD 49.6 fl      MPV 11.8 fL      Platelets 143 10*3/mm3      Neutrophil % 89.5 %      Lymphocyte % 4.1 %      Monocyte % 5.2 %      Eosinophil % 0.2 %      Basophil % 0.3 %      Immature Grans % 0.7 %      Neutrophils, Absolute 19.27 10*3/mm3      Lymphocytes, Absolute 0.88 10*3/mm3      Monocytes, Absolute 1.13 10*3/mm3      Eosinophils, Absolute 0.05 10*3/mm3      Basophils, Absolute 0.06 10*3/mm3      Immature Grans, Absolute 0.15 10*3/mm3      nRBC 0.0 /100 WBC     Blood Culture - Blood, Arm, Right [[id number]] Collected: 12/01/24 1702    Specimen: Blood from Arm, Right Updated: 12/01/24 1706    CBC & Differential [[id number]]  (Abnormal) Collected: 12/02/24 0527    Specimen: Blood Updated: 12/02/24 0548    Narrative:      The following orders were created for panel order CBC & Differential.  Procedure                               Abnormality         Status                     ---------                               -----------         ------                     CBC Auto Differential[[id number]]        Abnormal            Final result               Scan Slide[[id number]]                                                                     Please view results for these tests on the individual orders.    Comprehensive Metabolic Panel [[id number]]  (Abnormal) Collected: 24    Specimen: Blood Updated: 24     Glucose 130 mg/dL      BUN 8 mg/dL      Creatinine 0.59 mg/dL      Sodium 137 mmol/L      Potassium 3.7 mmol/L      Chloride 106 mmol/L      CO2 22.6 mmol/L      Calcium 8.1 mg/dL      Total Protein 4.8 g/dL      Albumin 2.4 g/dL      ALT (SGPT) 6 U/L      AST (SGOT) 16 U/L      Alkaline Phosphatase 111 U/L      Total Bilirubin 0.2 mg/dL      Globulin 2.4 gm/dL      A/G Ratio 1.0 g/dL      BUN/Creatinine Ratio 13.6     Anion Gap 8.4 mmol/L      eGFR 120.7 mL/min/1.73     Narrative:      GFR Normal >60  Chronic Kidney Disease <60  Kidney Failure <15      CBC Auto Differential [[id number]]  (Abnormal) Collected: 24    Specimen: Blood Updated: 24     WBC 18.75 10*3/mm3      RBC 2.75 10*6/mm3      Hemoglobin 7.7 g/dL      Hematocrit 23.8 %      MCV 86.5 fL      MCH 28.0 pg      MCHC 32.4 g/dL      RDW 15.8 %      RDW-SD 49.4 fl      MPV 11.6 fL      Platelets 138 10*3/mm3      Neutrophil % 85.9 %      Lymphocyte % 7.6 %      Monocyte % 5.1 %      Eosinophil % 0.1 %      Basophil % 0.1 %      Immature Grans % 1.2 %      Neutrophils, Absolute 16.11 10*3/mm3      Lymphocytes, Absolute 1.42 10*3/mm3      Monocytes, Absolute 0.95 10*3/mm3      Eosinophils, Absolute 0.02 10*3/mm3      Basophils, Absolute 0.02 10*3/mm3      Immature Grans, Absolute 0.23 10*3/mm3      nRBC 0.0 /100 WBC              Assessment / Plan     Assessment:  Post-partum Day: 2   Status post , Low Transverse      Normal labor    Rubella non-immune status, antepartum    Group B Streptococcus urinary tract infection affecting pregnancy, antepartum    Asthma due to environmental allergies    Anemia of mother in pregnancy    Multigravida of advanced maternal age in third trimester     delivery delivered    Non-reassuring  fetal heart rate or rhythm affecting management of mother    Plan:   Continue routine postoperative care.  Ambulate, PO pain medications, Shower, Remove bandage, Incision care instructions, reviewed the importance of wound care, Keep the incision clean and dry, and Breast feeding support  The patient has been afebrile since her 100.4 temperature yesterday afternoon.  Plan to continue IV antibiotics until the patient has been afebrile for at least 24 hours.  The patient's hemoglobin has slowly drifted down, however, I felt the initial hemoglobin yesterday did not adequately reflect blood loss in the operating room.  The patient's vitals are stable.  She is urinating well.  Will continue to monitor H&H.  Plan for IV iron today.  Plan of care has been reviewed with patient.  All questions have been answered.    Electronically signed by Robert Silva MD, 12/02/24, 7:45 AM EST.

## 2024-12-02 NOTE — ANESTHESIA POSTPROCEDURE EVALUATION
Patient: Sera Hoff    Procedure Summary       Date: 24 Room / Location: [facility] LABOR DELIVERY  [facility] LABOR DELIVERY    Anesthesia Start:  Anesthesia Stop:     Procedure:  SECTION PRIMARY (Abdomen) Diagnosis:     Surgeons: Robert Silva MD Provider: Vin Dupont CRNA    Anesthesia Type: epidural ASA Status: 2            Anesthesia Type: epidural    Vitals  Vitals Value Taken Time   /83 24 0115   Temp 36.8 °C (98.2 °F) 24 0000   Pulse 98 24 0115   Resp 20 24 0115   SpO2 100 % 245           Post Anesthesia Care and Evaluation    Patient location during evaluation: bedside  Patient participation: complete - patient participated  Level of consciousness: awake  Pain score: 1  Pain management: adequate    Airway patency: patent  Anesthetic complications: No anesthetic complications  PONV Status: none  Cardiovascular status: acceptable  Respiratory status: acceptable  Hydration status: acceptable  Post Neuraxial Block status: Motor and sensory function returned to baseline and No signs or symptoms of PDPHNo anesthesia care post op

## 2024-12-02 NOTE — PLAN OF CARE
Goal Outcome Evaluation:  Plan of Care Reviewed With: patient        Progress: improving  Outcome Evaluation: patient received IV antibiotics and iron infusion during shift. Antibiotics switched to PO. patient voiding and stooling without difficulty, up ad raquel. Pain managed with PRN and scheduled antibiotics. vitals, fundus and lochia all WNL during shift, patient remained afebrile.

## 2024-12-02 NOTE — PLAN OF CARE
Goal Outcome Evaluation:              Outcome Evaluation: Patient ambulates to bathroom. Voiding well. Pain controlled. Bonding well with infant.

## 2024-12-03 VITALS
DIASTOLIC BLOOD PRESSURE: 72 MMHG | RESPIRATION RATE: 18 BRPM | HEART RATE: 92 BPM | HEIGHT: 66 IN | WEIGHT: 141 LBS | OXYGEN SATURATION: 99 % | SYSTOLIC BLOOD PRESSURE: 123 MMHG | BODY MASS INDEX: 22.66 KG/M2 | TEMPERATURE: 98.6 F

## 2024-12-03 LAB
BASOPHILS # BLD AUTO: 0.04 10*3/MM3 (ref 0–0.2)
BASOPHILS NFR BLD AUTO: 0.3 % (ref 0–1.5)
DEPRECATED RDW RBC AUTO: 53.2 FL (ref 37–54)
EOSINOPHIL # BLD AUTO: 0.1 10*3/MM3 (ref 0–0.4)
EOSINOPHIL NFR BLD AUTO: 0.7 % (ref 0.3–6.2)
ERYTHROCYTE [DISTWIDTH] IN BLOOD BY AUTOMATED COUNT: 16 % (ref 12.3–15.4)
HCT VFR BLD AUTO: 25.3 % (ref 34–46.6)
HGB BLD-MCNC: 7.7 G/DL (ref 12–15.9)
IMM GRANULOCYTES # BLD AUTO: 0.51 10*3/MM3 (ref 0–0.05)
IMM GRANULOCYTES NFR BLD AUTO: 3.7 % (ref 0–0.5)
LYMPHOCYTES # BLD AUTO: 1.26 10*3/MM3 (ref 0.7–3.1)
LYMPHOCYTES NFR BLD AUTO: 9.2 % (ref 19.6–45.3)
MCH RBC QN AUTO: 28 PG (ref 26.6–33)
MCHC RBC AUTO-ENTMCNC: 30.4 G/DL (ref 31.5–35.7)
MCV RBC AUTO: 92 FL (ref 79–97)
MONOCYTES # BLD AUTO: 0.83 10*3/MM3 (ref 0.1–0.9)
MONOCYTES NFR BLD AUTO: 6.1 % (ref 5–12)
NEUTROPHILS NFR BLD AUTO: 10.97 10*3/MM3 (ref 1.7–7)
NEUTROPHILS NFR BLD AUTO: 80 % (ref 42.7–76)
NRBC BLD AUTO-RTO: 0 /100 WBC (ref 0–0.2)
PLAT MORPH BLD: NORMAL
PLATELET # BLD AUTO: 155 10*3/MM3 (ref 140–450)
PMV BLD AUTO: 11.7 FL (ref 6–12)
RBC # BLD AUTO: 2.75 10*6/MM3 (ref 3.77–5.28)
RBC MORPH BLD: NORMAL
WBC MORPH BLD: NORMAL
WBC NRBC COR # BLD AUTO: 13.71 10*3/MM3 (ref 3.4–10.8)

## 2024-12-03 PROCEDURE — 85007 BL SMEAR W/DIFF WBC COUNT: CPT | Performed by: OBSTETRICS & GYNECOLOGY

## 2024-12-03 PROCEDURE — 85025 COMPLETE CBC W/AUTO DIFF WBC: CPT | Performed by: OBSTETRICS & GYNECOLOGY

## 2024-12-03 RX ORDER — CLINDAMYCIN HYDROCHLORIDE 300 MG/1
300 CAPSULE ORAL EVERY 8 HOURS SCHEDULED
Qty: 28 CAPSULE | Refills: 0 | Status: SHIPPED | OUTPATIENT
Start: 2024-12-03 | End: 2024-12-13

## 2024-12-03 RX ORDER — CEPHALEXIN 500 MG/1
500 CAPSULE ORAL EVERY 8 HOURS SCHEDULED
Qty: 28 CAPSULE | Refills: 0 | Status: SHIPPED | OUTPATIENT
Start: 2024-12-03 | End: 2024-12-13

## 2024-12-03 RX ORDER — DOCUSATE SODIUM 100 MG/1
100 CAPSULE, LIQUID FILLED ORAL 2 TIMES DAILY
Qty: 60 CAPSULE | Refills: 1 | Status: SHIPPED | OUTPATIENT
Start: 2024-12-03

## 2024-12-03 RX ORDER — HYDROCODONE BITARTRATE AND ACETAMINOPHEN 5; 325 MG/1; MG/1
1-2 TABLET ORAL EVERY 6 HOURS PRN
Qty: 18 TABLET | Refills: 0 | Status: SHIPPED | OUTPATIENT
Start: 2024-12-03

## 2024-12-03 RX ORDER — PANTOPRAZOLE SODIUM 40 MG/1
40 TABLET, DELAYED RELEASE ORAL DAILY PRN
Status: DISCONTINUED | OUTPATIENT
Start: 2024-12-03 | End: 2024-12-03 | Stop reason: HOSPADM

## 2024-12-03 RX ORDER — IBUPROFEN 600 MG/1
600 TABLET, FILM COATED ORAL EVERY 6 HOURS PRN
Qty: 60 TABLET | Refills: 1 | Status: SHIPPED | OUTPATIENT
Start: 2024-12-03

## 2024-12-03 RX ADMIN — IBUPROFEN 600 MG: 600 TABLET, FILM COATED ORAL at 01:46

## 2024-12-03 RX ADMIN — OXYCODONE 5 MG: 5 TABLET ORAL at 02:59

## 2024-12-03 RX ADMIN — FERROUS SULFATE TAB 325 MG (65 MG ELEMENTAL FE) 325 MG: 325 (65 FE) TAB at 08:44

## 2024-12-03 RX ADMIN — OXYCODONE 5 MG: 5 TABLET ORAL at 12:31

## 2024-12-03 RX ADMIN — CLINDAMYCIN HYDROCHLORIDE 300 MG: 300 CAPSULE ORAL at 05:48

## 2024-12-03 RX ADMIN — ACETAMINOPHEN 650 MG: 325 TABLET ORAL at 05:48

## 2024-12-03 RX ADMIN — CLINDAMYCIN HYDROCHLORIDE 300 MG: 300 CAPSULE ORAL at 13:15

## 2024-12-03 RX ADMIN — IBUPROFEN 600 MG: 600 TABLET, FILM COATED ORAL at 13:16

## 2024-12-03 RX ADMIN — DOCUSATE SODIUM 100 MG: 100 CAPSULE, LIQUID FILLED ORAL at 08:44

## 2024-12-03 RX ADMIN — ACETAMINOPHEN 650 MG: 325 TABLET ORAL at 11:35

## 2024-12-03 RX ADMIN — CEPHALEXIN 500 MG: 500 CAPSULE ORAL at 13:16

## 2024-12-03 RX ADMIN — CEPHALEXIN 500 MG: 500 CAPSULE ORAL at 05:48

## 2024-12-03 NOTE — DISCHARGE SUMMARY
[facility]         Discharge Summary    Patient Name: Sera Hoff  : 1990  MRN: [id number]  Primary Care Physician: Jerica Moody APRN    Date of Admission: 2024  Date of Discharge: 12/3/2024    Consults       No orders found from 2024 to 2024.             Procedures:  Procedure(s):   SECTION PRIMARY    Presenting Problem:   Normal labor [O80, Z37.9]    Admitting Diagnosis:  34 y.o.  at 38w2d    Normal labor    Rubella non-immune status, antepartum    Group B Streptococcus urinary tract infection affecting pregnancy, antepartum    Asthma due to environmental allergies    Anemia of mother in pregnancy    Multigravida of advanced maternal age in third trimester      Discharge Diagnosis:  34 y.o.  at 38w2d    Normal labor    Rubella non-immune status, antepartum    Group B Streptococcus urinary tract infection affecting pregnancy, antepartum    Asthma due to environmental allergies    Anemia of mother in pregnancy    Multigravida of advanced maternal age in third trimester     delivery delivered    Non-reassuring fetal heart rate or rhythm affecting management of mother      Delivery Summary     OB Surgeon: Robert Silva M.D.  Anesthesia: Epidural  Delivery Type:  LTCS  Perineum: OBPERINEUM: Intact  Feeding method: Breast    Infant: female infant;    Weight: 3230 g (7 lb 1.9 oz)    APGARS: 8  @ 1 minute / 9  @ 5 minutes   Venous Blood Gas:   pH, Cord Venous   Date Value Ref Range Status   2024 7.321 7.260 - 7.400 pH Units Final     Base Excess, Cord Venous   Date Value Ref Range Status   2024 -3.3 -30.0 - 30.0 mmol/L Final     Comment:     Serial Number: [id number]:  [id number]      Arterial Blood Gas:   pH, Cord Arterial   Date Value Ref Range Status   2024 7.31 7.18 - 7.34 pH Units Final     Base Exc, Cord Arterial   Date Value Ref Range Status   2024 -3.5 (L) -2.0 - 2.0 mmol/L Final     Comment:      Serial Number: [id number]:  [id number]        Hospital Course     Hospital Course:  Sera Hoff is a 34 y.o.  38w2d who presented on 2024 for contractions increasing intensity and frequency.  This patient spontaneously changed her cervix to 4 cm during her triage evaluation and was admitted with term labor.  She was GBS positive so she was started on antibiotics.  The patient progressed spontaneously to 5 cm and then somewhat stalled.  She did have an epidural placed.  After the stall and appropriate antibiotic duration, amniotomy was performed.  An IUPC was placed to ensure adequate contractions.  The patient was started on oxytocin to augment her contractions due to contraction and adequacy.  She progressed to 8 cm of dilation and began to have recurrent late decelerations with some variable decelerations and extend.  These continue despite resuscitative efforts.  The patient cervical exam and also again stalled at 8 cm of dilation.  Given the continuation of a concerning fetal heart rate tracing a primary  delivery was recommended.  For full details of that procedure please see the separate dictated operative narrative.  After initial recovery in the PACU, the patient was transferred to the postpartum and for further postpartum care.  The morning of postop day 1, her labs were appropriate.  Her Soria catheter was discontinued.  She was able to void without difficulty.  On postop day 1 in the early afternoon, the patient had a temperature of 100.4.  She had several low-grade temperatures in the upper 99's prior to that.  Due to the more complex nature of her  delivery labs were obtained including a CBC, CMP, and blood cultures.  Her CBC was stable.  Her CMP P was reassuring.  IV antibiotics were started for a presumptive endometritis.  The patient defervesced almost immediately.  She never had any further febrile temperatures.  After 24 hours of IV antibiotics she was  transitioned to oral antibiotics with clindamycin and Keflex.  She continued to remain afebrile overnight and into the next day.  On postop day 3, the patient continued to do well.  She was afebrile at this point.  Her vital signs were stable.  Her labs were stable.  She was given a dose of IV iron for her anemia, which was felt to be due to intraoperative losses.  She was voiding without difficulty.  She was passing flatus.  She had no nausea or vomiting.  She was tolerating a regular diet.  Her exam was benign.  Her incision was clean, dry, intact and well-healing.  There was no signs of infection.  Her lochia was appropriate.  Her infant was doing well.  Given her excellent postoperative course she was felt stable for discharge home.    Day of Discharge     Vital Signs:  Temp:  [98.1 °F (36.7 °C)-99.5 °F (37.5 °C)] 98.4 °F (36.9 °C)  Heart Rate:  [] 73  Resp:  [16-20] 16  BP: (107-129)/(63-76) 108/66    Pertinent  and/or Most Recent Results     LAB RESULTS:       Lab 12/03/24  0510 12/02/24  0527 12/01/24  1658 12/01/24  0553 11/30/24  1115   WBC 13.71* 18.75* 21.54* 20.43* 13.66*   HEMOGLOBIN 7.7* 7.7* 8.4* 9.1* 10.6*   HEMATOCRIT 25.3* 23.8* 26.7* 29.1* 32.7*   PLATELETS 155 138* 143 145 144   NEUTROS ABS 10.97* 16.11* 19.27* 18.30*  --    IMMATURE GRANS (ABS) 0.51* 0.23* 0.15* 0.19*  --    LYMPHS ABS 1.26 1.42 0.88 0.83  --    MONOS ABS 0.83 0.95* 1.13* 1.04*  --    EOS ABS 0.10 0.02 0.05 0.00  --    MCV 92.0 86.5 87.5 88.7 87.2         Lab 12/02/24  0527 12/01/24  1658 11/30/24  1115   SODIUM 137 134* 135*   POTASSIUM 3.7 3.7 3.7   CHLORIDE 106 103 103   CO2 22.6 21.8* 19.8*   ANION GAP 8.4 9.2 12.2   BUN 8 10 10   CREATININE 0.59 0.70 0.66   EGFR 120.7 115.8 117.5   GLUCOSE 130* 96 101*   CALCIUM 8.1* 8.2* 9.0         Lab 12/02/24  0527 11/30/24  1115   TOTAL PROTEIN 4.8* 6.6   ALBUMIN 2.4* 3.7   GLOBULIN 2.4 2.9   ALT (SGPT) 6 11   AST (SGOT) 16 15   BILIRUBIN 0.2 0.3   ALK PHOS 111 153*              Lab 12/02/24  0527 11/30/24  1309   IRON 10*  --    IRON SATURATION (TSAT) 3*  --    TIBC 359  --    TRANSFERRIN 241  --    FERRITIN 82.54  --    ABO TYPING  --  O   RH TYPING  --  Positive   ANTIBODY SCREEN  --  Negative     URINALYSIS@  Microbiology Results (last 10 days)       Procedure Component Value - Date/Time    Blood Culture - Blood, Arm, Right [[id number]]  (Normal) Collected: 12/01/24 1702    Lab Status: Preliminary result Specimen: Blood from Arm, Right Updated: 12/02/24 1715     Blood Culture No growth at 24 hours    Blood Culture - Blood, Arm, Right [[id number]]  (Normal) Collected: 12/01/24 1658    Lab Status: Preliminary result Specimen: Blood from Arm, Right Updated: 12/02/24 1715     Blood Culture No growth at 24 hours             Discharge Details        Discharge Medications        New Medications        Instructions Start Date   cephalexin 500 MG capsule  Commonly known as: KEFLEX   500 mg, Oral, Every 8 Hours Scheduled      clindamycin 300 MG capsule  Commonly known as: CLEOCIN   300 mg, Oral, Every 8 Hours Scheduled      docusate sodium 100 MG capsule  Commonly known as: Colace   100 mg, Oral, 2 Times Daily      HYDROcodone-acetaminophen 5-325 MG per tablet  Commonly known as: Norco   1-2 tablets, Oral, Every 6 Hours PRN      ibuprofen 600 MG tablet  Commonly known as: ADVIL,MOTRIN   600 mg, Oral, Every 6 Hours PRN             Continue These Medications        Instructions Start Date   ferrous sulfate 325 (65 FE) MG tablet   325 mg, Oral, Every Other Day      PRENATAL COMPLETE PO   1 tablet, Daily             Stop These Medications      pantoprazole 20 MG EC tablet  Commonly known as: PROTONIX              Allergies   Allergen Reactions    Penicillins Rash    Sulfa Antibiotics Rash    Zofran [Ondansetron] Nausea And Vomiting       Discharge Disposition:   Home, self-care    Discharge Condition:  Good    Diet:   Regular    Discharge Activity:  Pelvic rest for 6 weeks, no intercourse for 6  weeks, no tampons or douching for 6 weeks, nothing in the vagina for 6 weeks  No driving for 2 weeks  No lifting more than 15 to 20 pounds for 2 weeks  No tub baths for 2 weeks, but may shower  Keep the incision clean and dry    Call the office or go to the emergency department for temperature greater than 100 °F, shortness of breath or chest pain, excessive nausea or vomiting, pain that is worsening despite current pain medications, redness, swelling, or drainage from the incision site, vaginal bleeding soaking a pad in less than 1 hour.    Follow Up:  Future Appointments   Date Time Provider Department Center   12/3/2024 10:00 AM Luci Lewis DO [facility]   12/9/2024 11:30 AM Luci Lewis DO [facility]   12/17/2024  9:30 AM Luci Lewis DO [facility]       Electronically signed by Robert Silva MD, 12/03/24, 8:37 AM EST.

## 2024-12-03 NOTE — PLAN OF CARE
Goal Outcome Evaluation:  Plan of Care Reviewed With: patient, significant other, parent        Progress: improving  Outcome Evaluation: Patient up ad raquel, performs self care and chaka care.  Voiding without difficulty.  VSS, FF -2U, scant bleeding, pink colored lochia.  Scheduled Tyelnol and Motrin forp pain control, medicated x 1 with Oxycodone for breakthrough pain.  Po antibiotics, no fever or s/sx of infection noted.  Discharged home with verbal and written instructions, verbalized understanding.

## 2024-12-03 NOTE — LACTATION NOTE
LC in to see this P1 patient. She is supplementing infant. Infant's weight loss is down 8% from birth weight. She had infant latched as LC arrived and good swallows seen. She states she has positive breast changes today. She has no pain with breastfeeding. LC demonstrated use of her personal pump and discussed pumping guidelines and storage and preparation of pumped milk. Patient is planning on discharge today. LC discussed normal infant output patterns to expect and if infant is not waking by 3 hours to wake and feed using measures shown in the hospital. LC discussed checking to make sure new medications are safe to breastfeed. LC discussed alcohol use and cigarette/second hand smoke around baby and breastfeeding and discussed the impact of street drugs on infants and breastfeeding. LC used the page in the breastfeeding guide to discuss harmful effects of these. Breastfeeding/Lactation expectations and anticipatory guidance discussed for the next two weeks . LC discussed nipple care, plugged ducts, engorgement, and breast infection. LC encouraged mom to see pediatrician two days from discharge for follow up. LC discussed breastfeeding/lactation resources including the local breastfeeding support group after discharge and when to call the doctor. Patient showed good understanding.

## 2024-12-03 NOTE — PLAN OF CARE
Goal Outcome Evaluation:  Plan of Care Reviewed With: patient        Progress: improving  Outcome Evaluation: Patient ambulating independently and voiding spontaneously. VS WNL, fundus firm and bleeding WNL. Patient received 1 dose PRN oxycodone this shift to decrease incisional pain she was experiencing. Patient received PO antibiotics this shift and tolerated well. Positive maternal infant bonding noted.

## 2024-12-03 NOTE — PROGRESS NOTES
[facility]   Delivery Postpartum Progress Note    Patient Name: Sera Hoff  : 1990  MRN: [id number]  Primary Care Physician:  Jerica Moody APRN  Date of Admission: 2024    Subjective     Chief Complaint: Postpartum    Subjective:  No complatins, Pain controlled, Tolerating a regular diet, No nausea, Passing flatus, Ambulating, Urinating without difficulty, Lochia normal/improving, No lightheadedness or dizziness, No headache, vision changes, right upper quadrant pain or epigastric pain, and Desires discharge home    Objective     Vitals:   Temp:  [98.1 °F (36.7 °C)-99.5 °F (37.5 °C)] 98.4 °F (36.9 °C)  Heart Rate:  [] 73  Resp:  [16-20] 16  BP: (107-129)/(63-76) 108/66    Physical Exam  General: alert and in no distress  Abdomen: Soft, Non-distended, Appropriately tender to palpation around the incision, Fundus firm and non-tender, and Fundal height U-2  Incision: clean, dry, and intact, healing well, no drainage, no erythema, no swelling, well approximated  Extremities: +1 edema    Intake/Output last 24 hours:    Intake/Output Summary (Last 24 hours) at 12/3/2024 0834  Last data filed at 2024 1009  Gross per 24 hour   Intake --   Output 450 ml   Net -450 ml       Intake/Output this shift:  No intake/output data recorded.    Labs     Lab Results (last 24 hours)       Procedure Component Value Units Date/Time    CBC & Differential [[id number]]  (Abnormal) Collected: 24    Specimen: Blood Updated: 24    Narrative:      The following orders were created for panel order CBC & Differential.  Procedure                               Abnormality         Status                     ---------                               -----------         ------                     CBC Auto Differential[[id number]]        Abnormal            Final result               Scan Slide[[id number]]                   Normal              Final result                 Please  view results for these tests on the individual orders.    CBC Auto Differential [[id number]]  (Abnormal) Collected: 24    Specimen: Blood Updated: 24     WBC 13.71 10*3/mm3      RBC 2.75 10*6/mm3      Hemoglobin 7.7 g/dL      Hematocrit 25.3 %      MCV 92.0 fL      MCH 28.0 pg      MCHC 30.4 g/dL      RDW 16.0 %      RDW-SD 53.2 fl      MPV 11.7 fL      Platelets 155 10*3/mm3      Neutrophil % 80.0 %      Lymphocyte % 9.2 %      Monocyte % 6.1 %      Eosinophil % 0.7 %      Basophil % 0.3 %      Immature Grans % 3.7 %      Neutrophils, Absolute 10.97 10*3/mm3      Lymphocytes, Absolute 1.26 10*3/mm3      Monocytes, Absolute 0.83 10*3/mm3      Eosinophils, Absolute 0.10 10*3/mm3      Basophils, Absolute 0.04 10*3/mm3      Immature Grans, Absolute 0.51 10*3/mm3      nRBC 0.0 /100 WBC     Scan Slide [[id number]]  (Normal) Collected: 24    Specimen: Blood Updated: 24     RBC Morphology Normal     WBC Morphology Normal     Platelet Morphology Normal             Assessment / Plan     Assessment:  Post-partum Day: 3   Status post , Low Transverse      Normal labor    Rubella non-immune status, antepartum    Group B Streptococcus urinary tract infection affecting pregnancy, antepartum    Asthma due to environmental allergies    Anemia of mother in pregnancy    Multigravida of advanced maternal age in third trimester     delivery delivered    Non-reassuring fetal heart rate or rhythm affecting management of mother    Plan:   Continue routine postoperative care.  Ambulate, PO pain medications, Shower, Incision care instructions, reviewed the importance of wound care, Keep the incision clean and dry, Breast feeding support, Discharge home, Discharge medications reviewed, Follow up scheduled, and Postpartum precautions reviewed  The patient has remained afebrile for nearly 48 hours now.  I feel the patient is stable for discharge home.  Due to the complicated nature of  her  delivery, I do plan on sending her home with a course of p.o. Keflex and clindamycin since she is breast-feeding.  Plan of care has been reviewed with patient.  All questions have been answered.    Electronically signed by Robert Silva MD, 24, 8:34 AM EST.

## 2024-12-06 LAB
BACTERIA SPEC AEROBE CULT: NORMAL
BACTERIA SPEC AEROBE CULT: NORMAL

## 2024-12-09 ENCOUNTER — POSTPARTUM VISIT (OUTPATIENT)
Dept: OBSTETRICS AND GYNECOLOGY | Facility: CLINIC | Age: 34
End: 2024-12-09
Payer: COMMERCIAL

## 2024-12-09 VITALS
SYSTOLIC BLOOD PRESSURE: 115 MMHG | BODY MASS INDEX: 19.77 KG/M2 | HEIGHT: 66 IN | WEIGHT: 123 LBS | HEART RATE: 137 BPM | DIASTOLIC BLOOD PRESSURE: 87 MMHG

## 2024-12-09 PROBLEM — Z37.9 NORMAL LABOR: Status: RESOLVED | Noted: 2024-11-30 | Resolved: 2024-12-09

## 2024-12-09 PROBLEM — O36.8390 NON-REASSURING FETAL HEART RATE OR RHYTHM AFFECTING MANAGEMENT OF MOTHER: Status: RESOLVED | Noted: 2024-12-01 | Resolved: 2024-12-09

## 2024-12-09 PROBLEM — Z28.39 RUBELLA NON-IMMUNE STATUS, ANTEPARTUM: Status: RESOLVED | Noted: 2024-05-15 | Resolved: 2024-12-09

## 2024-12-09 PROBLEM — O09.899 RUBELLA NON-IMMUNE STATUS, ANTEPARTUM: Status: RESOLVED | Noted: 2024-05-15 | Resolved: 2024-12-09

## 2024-12-09 PROBLEM — O23.40 GROUP B STREPTOCOCCUS URINARY TRACT INFECTION AFFECTING PREGNANCY, ANTEPARTUM: Status: RESOLVED | Noted: 2024-05-15 | Resolved: 2024-12-09

## 2024-12-09 PROBLEM — B95.1 GROUP B STREPTOCOCCUS URINARY TRACT INFECTION AFFECTING PREGNANCY, ANTEPARTUM: Status: RESOLVED | Noted: 2024-05-15 | Resolved: 2024-12-09

## 2024-12-09 PROBLEM — O09.523 MULTIGRAVIDA OF ADVANCED MATERNAL AGE IN THIRD TRIMESTER: Status: RESOLVED | Noted: 2024-11-30 | Resolved: 2024-12-09

## 2024-12-09 PROCEDURE — 0503F POSTPARTUM CARE VISIT: CPT | Performed by: OBSTETRICS & GYNECOLOGY

## 2024-12-09 RX ORDER — FERROUS SULFATE 325(65) MG
325 TABLET ORAL EVERY OTHER DAY
Qty: 45 TABLET | Refills: 4 | Status: SHIPPED | OUTPATIENT
Start: 2024-12-09

## 2024-12-09 NOTE — ASSESSMENT & PLAN NOTE
Stable postpartum course  Continue postop restrictions  Continue pelvic rest  Continue prophylactic antibiotics  Continue prenatal vitamin while breast-feeding

## 2024-12-09 NOTE — PROGRESS NOTES
"[facility]  Postpartum Follow Up       CC: Incision check    Antepartum or Postpartum Complications: AMA  Delivery type:   LTCS  Perineum : Intact  Feeding: Breast    Subjective:     Headache:  No  Vision changes:  No  RUQ/epigastric pain:  No  Swelling:  No  Pain:  No  Vaginal Bleeding: Very light  Depressed/Anxious:  No  EPDS score: 6  No fever or chills.  Pain is much less than when she was in the hospital.  Overall doing very well    Objective:   /87   Pulse (!) 137   Ht 167.6 cm (66\")   Wt 55.8 kg (123 lb)   LMP 2024 (Exact Date)   Breastfeeding Yes   BMI 19.85 kg/m²     Physical Exam  Vitals and nursing note reviewed. Exam conducted with a chaperone present.   Constitutional:       General: She is not in acute distress.     Appearance: Normal appearance. She is not ill-appearing.   Abdominal:      General: Abdomen is flat. There is no distension.      Palpations: Abdomen is soft. There is no mass.      Tenderness: There is no abdominal tenderness. There is no rebound.      Hernia: No hernia is present.      Comments: The incision is clean, dry, intact and well-healed.  There are no signs of infection.   Musculoskeletal:         General: No swelling.      Right lower leg: No edema.      Left lower leg: No edema.   Skin:     General: Skin is warm and dry.      Findings: No rash.   Neurological:      Mental Status: She is alert and oriented to person, place, and time.   Psychiatric:         Mood and Affect: Mood normal.         Behavior: Behavior normal.         Thought Content: Thought content normal.         Judgment: Judgment normal.       Last PAP:   Last Completed Pap Smear            PAP SMEAR (Every 3 Years) Next due on 2022  IgP, Aptima HPV    2020  PAP SMEAR SCANNED                    Assessment and Plan:  Diagnoses and all orders for this visit:    1. Encounter for postpartum visit (Primary)  Assessment & Plan:  Stable postpartum " course  Continue postop restrictions  Continue pelvic rest  Continue prophylactic antibiotics  Continue prenatal vitamin while breast-feeding      2. Postpartum anemia  Overview:  2024 iron QOD Rx, continue    Assessment & Plan:  Continue ferrous sulfate  Recheck labs next visit      3.  delivery delivered        Counseling:    Postpartum/Postop  precautions reviewed.  Post-op/Postpartum laceration pain, bleeding, infection precautions.  Incisional care reviewed, keep clean and dry.    Follow Up:  No follow-ups on file.    Robert Silva MD  2024

## 2024-12-09 NOTE — TELEPHONE ENCOUNTER
Maria Antonia'd refill on Ferrous Sulfate per protocol x 1 year.  Last seen 11/26/24.  Next appointment 12/9/24

## 2024-12-10 ENCOUNTER — TELEPHONE (OUTPATIENT)
Dept: LACTATION | Facility: HOSPITAL | Age: 34
End: 2024-12-10
Payer: COMMERCIAL

## 2024-12-10 NOTE — TELEPHONE ENCOUNTER
LC called to check on breastfeeding. Patient states baby is doing well but does struggle to get on the breast and taking some patience at some feedings. LC discussed that this is still very normal but if it is not better with in a few days to call to make an outpatient lactation appt to allow LC to work with them. Patient expressed understanding.

## 2024-12-12 ENCOUNTER — MATERNAL SCREENING (OUTPATIENT)
Dept: CALL CENTER | Facility: HOSPITAL | Age: 34
End: 2024-12-12
Payer: COMMERCIAL

## 2024-12-12 NOTE — OUTREACH NOTE
Maternal Screening Survey      Flowsheet Row Responses   Facility patient discharged from? [facility]   Attempt successful? Yes   Call start time 1405   Call end time 1407   I have been able to laugh and see the funny side of things. 0   I have looked forward with enjoyment to things. 0   I have blamed myself unnecessarily when things went wrong. 2   I have been anxious or worried for no good reason. 2   I have felt scared or panicky for no good reason. 0   Things have been getting on top of me. 0   I have been so unhappy that I have had difficulty sleeping. 0   I have felt sad or miserable. 0   I have been so unhappy that I have been crying. 0   The thought of harming myself has occurred to me. 0   [place]  Depression Scale Total 4   Did any of your parents have problems with alcohol or drug use? No   Do any of your peers have problems with alcohol or drug use? No   Does your partner have problems with alcohol or drug use? No   Before you were pregnant did you have problems with alcohol or drug use? (past) No   In the past month, did you drink beer, wine, liquor or use any other drugs? (pregnancy) No   Maternal Screening call completed Yes   Call end time 1407              Sabra GALE - Registered Nurse

## 2025-01-13 ENCOUNTER — POSTPARTUM VISIT (OUTPATIENT)
Dept: OBSTETRICS AND GYNECOLOGY | Facility: CLINIC | Age: 35
End: 2025-01-13
Payer: MEDICAID

## 2025-01-13 VITALS
HEART RATE: 81 BPM | SYSTOLIC BLOOD PRESSURE: 108 MMHG | WEIGHT: 125 LBS | DIASTOLIC BLOOD PRESSURE: 77 MMHG | HEIGHT: 66 IN | BODY MASS INDEX: 20.09 KG/M2

## 2025-01-13 DIAGNOSIS — F41.9 ANXIETY: ICD-10-CM

## 2025-01-13 LAB
DEPRECATED RDW RBC AUTO: 43.2 FL (ref 37–54)
ERYTHROCYTE [DISTWIDTH] IN BLOOD BY AUTOMATED COUNT: 14.3 % (ref 12.3–15.4)
HCT VFR BLD AUTO: 35.1 % (ref 34–46.6)
HGB BLD-MCNC: 11.9 G/DL (ref 12–15.9)
MCH RBC QN AUTO: 28.6 PG (ref 26.6–33)
MCHC RBC AUTO-ENTMCNC: 33.9 G/DL (ref 31.5–35.7)
MCV RBC AUTO: 84.4 FL (ref 79–97)
PLATELET # BLD AUTO: 249 10*3/MM3 (ref 140–450)
PMV BLD AUTO: 11 FL (ref 6–12)
RBC # BLD AUTO: 4.16 10*6/MM3 (ref 3.77–5.28)
WBC NRBC COR # BLD AUTO: 6.98 10*3/MM3 (ref 3.4–10.8)

## 2025-01-13 PROCEDURE — 85027 COMPLETE CBC AUTOMATED: CPT | Performed by: OBSTETRICS & GYNECOLOGY

## 2025-01-13 RX ORDER — BUSPIRONE HYDROCHLORIDE 10 MG/1
10 TABLET ORAL DAILY PRN
COMMUNITY

## 2025-01-13 NOTE — PROGRESS NOTES
"[facility]  Postpartum Follow Up Visit    CC:  Postpartum follow up     Antepartum or Postpartum Complications: AMA  Delivery type:   LTCS  Perineum : Intact  Feeding: Bottle    Subjective:     Headache:  No  Vision changes:  No  RUQ/epigastric pain:  No  Swelling:  No  Pain:  No  Vaginal Bleeding: Only intermittent light spotting  Depressed/Anxious:  Yes  EPDS score: 8  The patient reports that she is having increased anxiety however she takes her BuSpar inconsistently.      Objective:   /77   Pulse 81   Ht 167.6 cm (66\")   Wt 56.7 kg (125 lb)   LMP 2024 (Exact Date)   Breastfeeding Yes   BMI 20.18 kg/m²     Physical Exam  Vitals and nursing note reviewed. Exam conducted with a chaperone present.   Constitutional:       General: She is not in acute distress.     Appearance: Normal appearance. She is not ill-appearing.   Abdominal:      General: Abdomen is flat. There is no distension.      Palpations: Abdomen is soft. There is no mass.      Tenderness: There is no abdominal tenderness. There is no rebound.      Hernia: No hernia is present.      Comments: The incision is clean, dry, intact and well-healed.  There are no signs of infection.   Musculoskeletal:         General: No swelling.      Right lower leg: No edema.      Left lower leg: No edema.   Skin:     General: Skin is warm and dry.      Findings: No rash.   Neurological:      Mental Status: She is alert and oriented to person, place, and time.   Psychiatric:         Mood and Affect: Mood normal.         Behavior: Behavior normal.         Thought Content: Thought content normal.         Judgment: Judgment normal.       Last PAP:   Last Completed Pap Smear            PAP SMEAR (Every 3 Years) Next due on 2022  IgP, Aptima HPV    2020  PAP SMEAR SCANNED                    Assessment and Plan:  Diagnoses and all orders for this visit:    1. Encounter for postpartum visit " (Primary)  Assessment & Plan:  Stable postpartum course  May resume normal activities  Recommend condom use for contraception.  The patient declines prescription contraception.  We discussed that I would recommend 18 to 24 months prior to a future pregnancy due to the large extension on the patient's left side of the hysterotomy during her delivery.  I recommended cervical lengths in the second trimester of a future pregnancy.  I have advised against the possibility of a trial of labor after  delivery.  Continue daily prenatal vitamin      2. Postpartum anemia  Overview:  2024 iron QOD Rx, continue    Assessment & Plan:  Check CBC    Orders:  -     CBC (No Diff)    3.  delivery delivered    4. Anxiety  Assessment & Plan:  We discussed that BuSpar is typically dosed 2-3 times per day.  I recommended she initiate taking that medication on at least a daily basis but preferably twice daily.  If she continues to have symptoms she is to notify me for reevaluation.  The patient could benefit from adding an SSRI          Counseling:  All birth control options reviewed in detail.  R/B/A/SE/E of each wrt pts PMHx and prior BC use  MAURI risk w hormonal BIRTH CONTROL reviewed (estrogen containing only), S/Sx to watch for discussed and questions answered.  Newer studies indicate possible increased breast cancer reviewed (both estrogen and progestin only).  Ok to resume intercourse  May resume normal activities  Core strengthening exercises reviewed and recommended  Kegel exercises reviewed and recommended  Ok to return to work/school once patient desires/maternity leave completed    Follow Up:  Return for Annual physical.    Robert Silva MD  2025

## 2025-01-14 NOTE — ASSESSMENT & PLAN NOTE
We discussed that BuSpar is typically dosed 2-3 times per day.  I recommended she initiate taking that medication on at least a daily basis but preferably twice daily.  If she continues to have symptoms she is to notify me for reevaluation.  The patient could benefit from adding an SSRI

## 2025-01-14 NOTE — ASSESSMENT & PLAN NOTE
Stable postpartum course  May resume normal activities  Recommend condom use for contraception.  The patient declines prescription contraception.  We discussed that I would recommend 18 to 24 months prior to a future pregnancy due to the large extension on the patient's left side of the hysterotomy during her delivery.  I recommended cervical lengths in the second trimester of a future pregnancy.  I have advised against the possibility of a trial of labor after  delivery.  Continue daily prenatal vitamin

## 2025-01-26 ENCOUNTER — PATIENT MESSAGE (OUTPATIENT)
Dept: OBSTETRICS AND GYNECOLOGY | Facility: CLINIC | Age: 35
End: 2025-01-26

## 2025-04-28 ENCOUNTER — OFFICE VISIT (OUTPATIENT)
Dept: FAMILY MEDICINE CLINIC | Facility: CLINIC | Age: 35
End: 2025-04-28
Payer: COMMERCIAL

## 2025-04-28 VITALS
RESPIRATION RATE: 18 BRPM | BODY MASS INDEX: 19.95 KG/M2 | OXYGEN SATURATION: 99 % | HEART RATE: 80 BPM | WEIGHT: 123.6 LBS | TEMPERATURE: 98 F | SYSTOLIC BLOOD PRESSURE: 119 MMHG | DIASTOLIC BLOOD PRESSURE: 76 MMHG

## 2025-04-28 DIAGNOSIS — H66.90 ACUTE OTITIS MEDIA, UNSPECIFIED OTITIS MEDIA TYPE: Primary | ICD-10-CM

## 2025-04-28 DIAGNOSIS — R05.1 ACUTE COUGH: ICD-10-CM

## 2025-04-28 RX ORDER — DEXTROMETHORPHAN POLISTIREX 30 MG/5ML
60 SUSPENSION ORAL EVERY 12 HOURS SCHEDULED
Qty: 280 ML | Refills: 0 | Status: CANCELLED | OUTPATIENT
Start: 2025-04-28

## 2025-04-28 RX ORDER — FLUTICASONE PROPIONATE 50 MCG
2 SPRAY, SUSPENSION (ML) NASAL DAILY
COMMUNITY

## 2025-04-28 RX ORDER — GUAIFENESIN 200 MG/10ML
200 LIQUID ORAL 3 TIMES DAILY PRN
Qty: 180 ML | Refills: 0 | Status: SHIPPED | OUTPATIENT
Start: 2025-04-28

## 2025-04-28 RX ORDER — CEFDINIR 300 MG/1
300 CAPSULE ORAL 2 TIMES DAILY
Qty: 14 CAPSULE | Refills: 0 | Status: SHIPPED | OUTPATIENT
Start: 2025-04-28 | End: 2025-05-05

## 2025-04-28 RX ORDER — ALBUTEROL SULFATE 90 UG/1
2 INHALANT RESPIRATORY (INHALATION) EVERY 4 HOURS PRN
Qty: 18 G | Refills: 0 | Status: SHIPPED | OUTPATIENT
Start: 2025-04-28

## 2025-04-28 RX ORDER — METHYLPREDNISOLONE ACETATE 40 MG/ML
40 INJECTION, SUSPENSION INTRA-ARTICULAR; INTRALESIONAL; INTRAMUSCULAR; SOFT TISSUE ONCE
Status: COMPLETED | OUTPATIENT
Start: 2025-04-28 | End: 2025-04-28

## 2025-04-28 RX ADMIN — METHYLPREDNISOLONE ACETATE 40 MG: 40 INJECTION, SUSPENSION INTRA-ARTICULAR; INTRALESIONAL; INTRAMUSCULAR; SOFT TISSUE at 14:05

## 2025-04-28 NOTE — PROGRESS NOTES
Chief Complaint   Patient presents with    follow up     -Cough  -Congestion  -Sweating @ night         Subjective     Sera Hoff  has a past medical history of Anxiety, Depression, Endometriosis, and Ovarian cyst.    HPI    History of Present Illness  The patient is a 34-year-old female who presents today for cough and congestion. She declines all swabs today as she recently had the flu and feels like she is just getting over it. She was recently pregnant, had a baby at the end of November 2024, and is currently breastfeeding.    Her symptoms began approximately 1.5 weeks ago, initially presenting as influenza-like symptoms with fever, chills, headache, and sore throat, despite a negative COVID-19 test. These symptoms have since evolved into a persistent cough that has been present for about a week. Difficulty in managing the cough is reported, which is exacerbated by talking and necessitates frequent coughing. She has been self-medicating with Zyrtec but reports intolerance to decongestants and cough or cold medicines, which induce jitteriness, anxiety, and vomiting.    Mild sinus pressure in the forehead was noted, which has largely resolved. Seasonal allergies are typically experienced. She is currently breastfeeding her 5-month-old infant.      Allergies   Allergen Reactions    Penicillins Rash    Sulfa Antibiotics Rash    Zofran [Ondansetron] Nausea And Vomiting         Current Outpatient Medications:     albuterol sulfate  (90 Base) MCG/ACT inhaler, Inhale 2 puffs Every 4 (Four) Hours As Needed for Wheezing., Disp: 18 g, Rfl: 0    busPIRone (BUSPAR) 10 MG tablet, Take 1 tablet by mouth Daily As Needed., Disp: , Rfl:     cefdinir (OMNICEF) 300 MG capsule, Take 1 capsule by mouth 2 (Two) Times a Day for 7 days., Disp: 14 capsule, Rfl: 0    fluticasone (FLONASE) 50 MCG/ACT nasal spray, Administer 2 sprays into the nostril(s) as directed by provider Daily., Disp: , Rfl:     guaifenesin (ROBITUSSIN)  100 MG/5ML liquid, Take 10 mL by mouth 3 (Three) Times a Day As Needed for Cough., Disp: 180 mL, Rfl: 0    Prenat MV-Min w/Fe-Folate-DHA (PRENATAL COMPLETE PO), Take 1 tablet by mouth Daily., Disp: , Rfl:   No current facility-administered medications for this visit.    Patient Active Problem List   Diagnosis    Endometriosis    Anxiety    Depression    Asthma due to environmental allergies        Past Surgical History:   Procedure Laterality Date    APPENDECTOMY      Bhavesh, endometriosis in appy     SECTION N/A 2024    Procedure:  SECTION PRIMARY;  Surgeon: Robert Silva MD;  Location: [facility] LABOR DELIVERY;  Service: Gynecology;  Laterality: N/A;    FULGURATION ENDOMETRIOSIS      Dr. Ospina, adv l/s surgeon, extensive endometriosis    FULGURATION ENDOMETRIOSIS      Dr. Ospina, adv l/s surgeon, extensive endometriosis    WISDOM TOOTH EXTRACTION         Social History     Socioeconomic History    Marital status: Significant Other   Tobacco Use    Smoking status: Never     Passive exposure: Never    Smokeless tobacco: Never   Vaping Use    Vaping status: Never Used   Substance and Sexual Activity    Alcohol use: Not Currently     Comment: RARE    Drug use: Never    Sexual activity: Yes     Partners: Male       Family History   Problem Relation Age of Onset    Prostate cancer Father     Hypertension Father     Hypertension Mother     Prostate cancer Paternal Grandfather     Diabetes Paternal Grandmother     Hypertension Paternal Grandmother     Stroke Maternal Grandmother     Hypertension Maternal Grandmother     Stroke Maternal Grandfather     Diabetes Maternal Grandfather     Hypertension Maternal Grandfather     Stroke Other         uncle    Diabetes Other         uncle    Breast cancer Neg Hx     Ovarian cancer Neg Hx     Uterine cancer Neg Hx     Colon cancer Neg Hx     Melanoma Neg Hx     Bleeding Disorder Neg Hx     Deep vein thrombosis Neg Hx     Pulmonary embolism Neg  Hx        Family history, surgical history, past medical history, Allergies and med's reviewed with patient today and updated in [facility].     ROS:  Review of Systems   HENT:  Positive for congestion and ear pain.    Respiratory:  Positive for cough.    All other systems reviewed and are negative.      OBJECTIVE:  Vitals:    04/28/25 1330   BP: 119/76   Pulse: 80   Resp: 18   Temp: 98 °F (36.7 °C)   TempSrc: Oral   SpO2: 99%   Weight: 56.1 kg (123 lb 9.6 oz)     Body mass index is 19.95 kg/m².  No LMP recorded.    Physical Exam  HENT:      Right Ear: A middle ear effusion is present.      Left Ear: A middle ear effusion is present. Tympanic membrane is erythematous and bulging.      Nose: Nose normal.      Right Sinus: No maxillary sinus tenderness or frontal sinus tenderness.      Left Sinus: No maxillary sinus tenderness or frontal sinus tenderness.      Mouth/Throat:      Pharynx: Uvula midline. Posterior oropharyngeal erythema and postnasal drip present.   Cardiovascular:      Rate and Rhythm: Normal rate and regular rhythm.      Pulses: Normal pulses.      Heart sounds: Normal heart sounds.   Pulmonary:      Effort: Pulmonary effort is normal.      Breath sounds: Normal breath sounds.   Neurological:      General: No focal deficit present.      Mental Status: She is oriented to person, place, and time. Mental status is at baseline.         Physical Exam  Ears: Left ear shows signs of infection with pus on the eardrum. Right ear has a small amount of fluid but appears normal.  Respiratory: Clear to auscultation, no wheezing, rales or rhonchi    Procedures     BMI is within normal parameters. No other follow-up for BMI required.      Health Maintenance Due   Topic Date Due    Pneumococcal Vaccine 0-49 (1 of 2 - PCV) Never done    ANNUAL PHYSICAL  03/19/2025        No visits with results within 30 Day(s) from this visit.   Latest known visit with results is:   Postpartum Visit on 01/13/2025   Component Date Value  Ref Range Status    WBC 01/13/2025 6.98  3.40 - 10.80 10*3/mm3 Final    RBC 01/13/2025 4.16  3.77 - 5.28 10*6/mm3 Final    Hemoglobin 01/13/2025 11.9 (L)  12.0 - 15.9 g/dL Final    Hematocrit 01/13/2025 35.1  34.0 - 46.6 % Final    MCV 01/13/2025 84.4  79.0 - 97.0 fL Final    MCH 01/13/2025 28.6  26.6 - 33.0 pg Final    MCHC 01/13/2025 33.9  31.5 - 35.7 g/dL Final    RDW 01/13/2025 14.3  12.3 - 15.4 % Final    RDW-SD 01/13/2025 43.2  37.0 - 54.0 fl Final    MPV 01/13/2025 11.0  6.0 - 12.0 fL Final    Platelets 01/13/2025 249  140 - 450 10*3/mm3 Final       Results        ASSESSMENT/ PLAN:    Diagnoses and all orders for this visit:    1. Acute otitis media, unspecified otitis media type (Primary)  -     cefdinir (OMNICEF) 300 MG capsule; Take 1 capsule by mouth 2 (Two) Times a Day for 7 days.  Dispense: 14 capsule; Refill: 0  -     methylPREDNISolone acetate (DEPO-medrol) injection 40 mg    2. Acute cough  -     albuterol sulfate  (90 Base) MCG/ACT inhaler; Inhale 2 puffs Every 4 (Four) Hours As Needed for Wheezing.  Dispense: 18 g; Refill: 0  -     guaifenesin (ROBITUSSIN) 100 MG/5ML liquid; Take 10 mL by mouth 3 (Three) Times a Day As Needed for Cough.  Dispense: 180 mL; Refill: 0  -     methylPREDNISolone acetate (DEPO-medrol) injection 40 mg        Assessment & Plan  1. Cough.  - Persistent cough for about a week.  - Lung sounds are clear; no wheezing or shortness of breath noted during the exam.  - Discussed the safety of medications while breastfeeding; Robitussin will be prescribed for cough relief.  - A short course of steroids will be administered via injection in the office for faster relief and to minimize excretion into breast milk. An albuterol inhaler will also be prescribed for use if she experiences shortness of breath or wheezing, with the understanding that it may cause a temporary increase in heart rate.    2. Congestion.  - Reports congestion and sinus pressure, which has mostly  resolved.  - Physical exam revealed no significant sinus tenderness; no facial pain or pressure currently.  - Reviewed the use of Zyrtec for relief; no additional treatment required at this time.    3. Left ear infection.  - History of ear infections; currently experiencing symptoms in the left ear, including muffled hearing and pain.  - Physical exam revealed pus on the left eardrum, indicating an infection.  - Discussed antibiotic options considering allergies to penicillins and sulfa drugs; cefdinir will be prescribed as it is safe for breastfeeding and effective against ear infections.    PROCEDURE  Procedure: Steroid injection, left deltoid    All questions were answered and agreement to proceed was given after the following Pre-Procedure details were reviewed:  - Risks and Benefits: Discussed potential side effects including increased heart rate and faster relief of symptoms.  - Alternative Options: Oral steroids were discussed as an alternative.  - Side effects: Increased heart rate, potential impact on breast milk.  - Consent: Verbal consent obtained.    Intra-Procedure:  - Time-Out: Confirmed patient identity, procedure, and site.  - Site Preparation: Cleaned with alcohol swab.  - Medication: Steroid injection administered.    Post-Procedure:  - Tolerance Level: Patient tolerated the procedure well.  - Home Care Instructions: Advised to monitor for any adverse reactions, use albuterol inhaler if needed, and follow up if symptoms persist.    Orders Placed Today:     New Medications Ordered This Visit   Medications    cefdinir (OMNICEF) 300 MG capsule     Sig: Take 1 capsule by mouth 2 (Two) Times a Day for 7 days.     Dispense:  14 capsule     Refill:  0    albuterol sulfate  (90 Base) MCG/ACT inhaler     Sig: Inhale 2 puffs Every 4 (Four) Hours As Needed for Wheezing.     Dispense:  18 g     Refill:  0    guaifenesin (ROBITUSSIN) 100 MG/5ML liquid     Sig: Take 10 mL by mouth 3 (Three) Times a Day As  Needed for Cough.     Dispense:  180 mL     Refill:  0    methylPREDNISolone acetate (DEPO-medrol) injection 40 mg        Management Plan:     An After Visit Summary was printed and given to the patient at discharge.    Follow-up: Return if symptoms worsen or fail to improve.    Patient or patient representative verbalized consent for the use of Ambient Listening during the visit with  SHELIA Wiggins for chart documentation. 4/28/2025  14:23 EDT    SHELIA Wiggins 4/28/2025 14:23 EDT  This note was electronically signed.

## (undated) DEVICE — INTENDED FOR TISSUE SEPARATION, AND OTHER PROCEDURES THAT REQUIRE A SHARP SURGICAL BLADE TO PUNCTURE OR CUT.: Brand: BARD-PARKER ® CARBON RIB-BACK BLADES

## (undated) DEVICE — C SECTION PACK: Brand: MEDLINE INDUSTRIES, INC.

## (undated) DEVICE — SUT VIC 3/0 CTI 36IN J944H

## (undated) DEVICE — PAD GRND REM POLYHESIVE A/ DISP

## (undated) DEVICE — Device: Brand: PORTEX

## (undated) DEVICE — SUT MNCRYL 0 CT1 27IN VIL

## (undated) DEVICE — CVR HNDL LT SURG ACCSSRY BLU STRL

## (undated) DEVICE — SUT CHRM 0 CT1 36IN 924H

## (undated) DEVICE — NEEDLE,18GX1.5",REG,BEVEL: Brand: MEDLINE

## (undated) DEVICE — LARGE, DISPOSABLE C-SECTION RETRACTOR: Brand: ALEXIS ® O C-SECTION PROTECTOR/RETRACTOR

## (undated) DEVICE — DEV TRANSF BLD W/LUER ADPT CA/198

## (undated) DEVICE — VIOLET BRAIDED (POLYGLACTIN 910), SYNTHETIC ABSORBABLE SUTURE: Brand: COATED VICRYL

## (undated) DEVICE — STERILE POLYISOPRENE POWDER-FREE SURGICAL GLOVES WITH EMOLLIENT COATING: Brand: PROTEXIS

## (undated) DEVICE — SUT MNCRYL 0/0 CTX 36IN Y398H

## (undated) DEVICE — GLV SURG BIOGEL LTX PF 7

## (undated) DEVICE — SUT MNCRYL 4/0 PS2 18 IN

## (undated) DEVICE — TRY CATH FOL ADVANCE SIL W/BAG 16F